# Patient Record
Sex: MALE | Race: ASIAN | NOT HISPANIC OR LATINO | Employment: FULL TIME | URBAN - METROPOLITAN AREA
[De-identification: names, ages, dates, MRNs, and addresses within clinical notes are randomized per-mention and may not be internally consistent; named-entity substitution may affect disease eponyms.]

---

## 2018-02-12 ENCOUNTER — APPOINTMENT (OUTPATIENT)
Dept: MEDICAL GROUP | Facility: MEDICAL CENTER | Age: 53
End: 2018-02-12
Payer: COMMERCIAL

## 2018-02-26 ENCOUNTER — OFFICE VISIT (OUTPATIENT)
Dept: MEDICAL GROUP | Facility: MEDICAL CENTER | Age: 53
End: 2018-02-26
Payer: COMMERCIAL

## 2018-02-26 VITALS
TEMPERATURE: 97.9 F | HEIGHT: 65 IN | HEART RATE: 84 BPM | OXYGEN SATURATION: 96 % | WEIGHT: 196.8 LBS | SYSTOLIC BLOOD PRESSURE: 124 MMHG | RESPIRATION RATE: 16 BRPM | BODY MASS INDEX: 32.79 KG/M2 | DIASTOLIC BLOOD PRESSURE: 88 MMHG

## 2018-02-26 DIAGNOSIS — I10 ESSENTIAL HYPERTENSION: ICD-10-CM

## 2018-02-26 DIAGNOSIS — E78.00 PURE HYPERCHOLESTEROLEMIA: ICD-10-CM

## 2018-02-26 DIAGNOSIS — R19.5 POSITIVE FECAL OCCULT BLOOD TEST: ICD-10-CM

## 2018-02-26 DIAGNOSIS — Z76.89 ENCOUNTER TO ESTABLISH CARE: ICD-10-CM

## 2018-02-26 PROCEDURE — 99204 OFFICE O/P NEW MOD 45 MIN: CPT | Performed by: PHYSICIAN ASSISTANT

## 2018-02-26 RX ORDER — SIMVASTATIN 10 MG
10 TABLET ORAL EVERY EVENING
Qty: 90 TAB | Refills: 3 | Status: SHIPPED | OUTPATIENT
Start: 2018-02-26 | End: 2019-06-25 | Stop reason: SDUPTHER

## 2018-02-26 RX ORDER — LISINOPRIL 40 MG/1
40 TABLET ORAL DAILY
Qty: 90 TAB | Refills: 3 | Status: SHIPPED | OUTPATIENT
Start: 2018-02-26 | End: 2019-04-26 | Stop reason: SDUPTHER

## 2018-02-26 RX ORDER — SIMVASTATIN 20 MG
10 TABLET ORAL
Status: SHIPPED | COMMUNITY
End: 2018-02-26 | Stop reason: SDUPTHER

## 2018-02-26 RX ORDER — AMLODIPINE BESYLATE 5 MG/1
5 TABLET ORAL DAILY
Qty: 90 TAB | Refills: 3 | Status: SHIPPED | OUTPATIENT
Start: 2018-02-26 | End: 2019-03-27 | Stop reason: SDUPTHER

## 2018-02-26 RX ORDER — LISINOPRIL 40 MG/1
40 TABLET ORAL DAILY
Status: SHIPPED | COMMUNITY
End: 2018-02-26 | Stop reason: SDUPTHER

## 2018-02-26 ASSESSMENT — PATIENT HEALTH QUESTIONNAIRE - PHQ9: CLINICAL INTERPRETATION OF PHQ2 SCORE: 0

## 2018-02-26 NOTE — LETTER
[employer]  Brayan De Oliveira P.A.-C.  [address]  [place] [address]  Fax: [phone]   Authorization for Release/Disclosure of   Protected Health Information   Name: LIN RAYGOZA : 1965 SSN: [id number]   Address: [address] [place] [address]  [place] [address] Phone:    [phone] (home)    I authorize the entity listed below to release/disclose the PHI below to:   [facility]/Brayan De Oliveira P.A.-C. and Brayan De Oliveira P.A.-C.   Provider or Entity Name:  Dr. Randall Chandler, [facility]   Address   City, [facility], [facility]   Phone:      Fax:     Reason for request: continuity of care   Information to be released:    [  ] LAST COLONOSCOPY,  including any PATH REPORT and follow-up  [  ] LAST FIT/COLOGUARD RESULT [  ] LAST DEXA  [  ] LAST MAMMOGRAM  [  ] LAST PAP  [  ] LAST LABS [  ] RETINA EXAM REPORT  [  ] IMMUNIZATION RECORDS  [ X ] Release all info      [  ] Check here and initial the line next to each item to release ALL health information INCLUDING  _____ Care and treatment for drug and / or alcohol abuse  _____ HIV testing, infection status, or AIDS  _____ Genetic Testing    DATES OF SERVICE OR TIME PERIOD TO BE DISCLOSED: _____________  I understand and acknowledge that:  * This Authorization may be revoked at any time by you in writing, except if your health information has already been used or disclosed.  * Your health information that will be used or disclosed as a result of you signing this authorization could be re-disclosed by the recipient. If this occurs, your re-disclosed health information may no longer be protected by State or Federal laws.  * You may refuse to sign this Authorization. Your refusal will not affect your ability to obtain treatment.  * This Authorization becomes effective upon signing and will  on (date) __________.      If no date is indicated, this Authorization will  one (1) year from the signature date.    Name: Lin Castaneda  Tri    Signature:   Date:     2/26/2018       PLEASE FAX REQUESTED RECORDS BACK TO: [phone]

## 2018-02-27 NOTE — ASSESSMENT & PLAN NOTE
Had a fecal occult blood test that was positive about 2 months ago. No family history of colon cancer. No change in weight. No change of bowel habits.

## 2018-02-27 NOTE — ASSESSMENT & PLAN NOTE
Has a chronic history of hyperlipidemia. He is taking Zocor 10 mg a day. Recent labs 2 months ago he believes were within normal limits. He was not contacted.

## 2018-02-27 NOTE — PROGRESS NOTES
"Subjective:   Olayinka Bartlett is a 52 y.o. male here today for establishing care and for hypertension and hypercholesterolemia.    Essential hypertension  This is a 52-year-old male accompanied by his wife, Danielle. He is here today to establish care. Has a chronic history of hypertension. Takes Norvasc and lisinopril daily. Requesting a refill. Has not been out of his medication.    Pure hypercholesterolemia  Has a chronic history of hyperlipidemia. He is taking Zocor 10 mg a day. Recent labs 2 months ago he believes were within normal limits. He was not contacted.    Positive fecal occult blood test  Had a fecal occult blood test that was positive about 2 months ago. No family history of colon cancer. No change in weight. No change of bowel habits.       Current medicines (including changes today)  Current Outpatient Prescriptions   Medication Sig Dispense Refill   • simvastatin (ZOCOR) 10 MG Tab Take 1 Tab by mouth every evening. 90 Tab 3   • amLODIPine (NORVASC) 5 MG Tab Take 1 Tab by mouth every day. 90 Tab 3   • lisinopril (PRINIVIL, ZESTRIL) 40 MG tablet Take 1 Tab by mouth every day. 90 Tab 3   • aspirin (ASA) 81 MG CHEW Take 81 mg by mouth every day.       No current facility-administered medications for this visit.      He  has no past medical history on file.    Social History and Family History were reviewed and updated.    ROS   No chest pain, no shortness of breath, no abdominal pain and all other systems were reviewed and are negative.       Objective:     Blood pressure 124/88, pulse 84, temperature 36.6 °C (97.9 °F), resp. rate 16, height 1.638 m (5' 4.5\"), weight 89.3 kg (196 lb 12.8 oz), SpO2 96 %. Body mass index is 33.26 kg/m².   Physical Exam:  Constitutional: Alert, no distress.  Skin: Warm, dry, good turgor, no rashes in visible areas.  Eye: Equal, round and reactive, conjunctiva clear, lids normal.  ENMT: Lips without lesions, good dentition, oropharynx clear.  Neck: Trachea " midline, no masses.   Lymph: No cervical or supraclavicular lymphadenopathy  Respiratory: Unlabored respiratory effort, lungs clear to auscultation, no wheezes, no ronchi.  Cardiovascular: Normal S1, S2, no murmur, no edema.  Abdomen: Soft, non-tender, no masses.  Psych: Alert and oriented x3, normal affect and mood.        Assessment and Plan:   The following treatment plan was discussed    1. Essential hypertension  Chronic condition. Stable. Renewed medications as directed.  - amLODIPine (NORVASC) 5 MG Tab; Take 1 Tab by mouth every day.  Dispense: 90 Tab; Refill: 3  - lisinopril (PRINIVIL, ZESTRIL) 40 MG tablet; Take 1 Tab by mouth every day.  Dispense: 90 Tab; Refill: 3    2. Pure hypercholesterolemia  Chronic condition. Likely controlled. Will obtain previous medical records from PCP. Renew Zocor 10 mg daily.  - simvastatin (ZOCOR) 10 MG Tab; Take 1 Tab by mouth every evening.  Dispense: 90 Tab; Refill: 3    3. Positive fecal occult blood test  Acute, new onset condition. Asymptomatic. Refer to GI to establish care for colorectal cancer screening.  - REFERRAL TO GASTROENTEROLOGY    4. Encounter to establish care      Followup: Return if symptoms worsen or fail to improve.    Please note that this dictation was created using voice recognition software. I have made every reasonable attempt to correct obvious errors, but I expect that there are errors of grammar and possibly content that I did not discover before finalizing the note.

## 2018-02-27 NOTE — ASSESSMENT & PLAN NOTE
This is a 52-year-old male accompanied by his wife, Danielle. He is here today to establish care. Has a chronic history of hypertension. Takes Norvasc and lisinopril daily. Requesting a refill. Has not been out of his medication.

## 2019-02-12 ENCOUNTER — OFFICE VISIT (OUTPATIENT)
Dept: MEDICAL GROUP | Facility: MEDICAL CENTER | Age: 54
End: 2019-02-12
Payer: COMMERCIAL

## 2019-02-12 VITALS
DIASTOLIC BLOOD PRESSURE: 84 MMHG | TEMPERATURE: 99.8 F | WEIGHT: 203.8 LBS | HEIGHT: 64 IN | SYSTOLIC BLOOD PRESSURE: 122 MMHG | OXYGEN SATURATION: 95 % | HEART RATE: 85 BPM | BODY MASS INDEX: 34.79 KG/M2

## 2019-02-12 DIAGNOSIS — I10 ESSENTIAL HYPERTENSION: ICD-10-CM

## 2019-02-12 DIAGNOSIS — Z00.00 PREVENTATIVE HEALTH CARE: ICD-10-CM

## 2019-02-12 DIAGNOSIS — R19.5 POSITIVE FECAL OCCULT BLOOD TEST: ICD-10-CM

## 2019-02-12 DIAGNOSIS — M54.9 UPPER BACK PAIN ON LEFT SIDE: ICD-10-CM

## 2019-02-12 DIAGNOSIS — E78.00 PURE HYPERCHOLESTEROLEMIA: ICD-10-CM

## 2019-02-12 DIAGNOSIS — L91.8 MULTIPLE ACQUIRED SKIN TAGS: ICD-10-CM

## 2019-02-12 PROCEDURE — 99214 OFFICE O/P EST MOD 30 MIN: CPT | Performed by: PHYSICIAN ASSISTANT

## 2019-02-12 ASSESSMENT — PATIENT HEALTH QUESTIONNAIRE - PHQ9: CLINICAL INTERPRETATION OF PHQ2 SCORE: 0

## 2019-02-13 NOTE — ASSESSMENT & PLAN NOTE
Has several skin tags on his face.  Not one up above his eyelid on the left side.  Would like them removed.

## 2019-02-13 NOTE — PROGRESS NOTES
"Subjective:   Olayinka Bartlett is a 53 y.o. male here today for upper back pain on the left side for several months, skin tags, positive blood in his stool, hypertension and hypercholesterolemia.    Upper back pain on left side  This is a 53-year-old male accompanied by his wife, Ashley.  He complains of a chronic history of intermittent upper left-sided back pain.  Symptoms only occur when he sneezes or coughs.  Has not done so in a while.  Denies any trauma.  Denies any lingering symptoms.  No arm pain.  No chest pain.  No medications.    Multiple acquired skin tags  Has several skin tags on his face.  Not one up above his eyelid on the left side.  Would like them removed.    Positive fecal occult blood test  Positive history.  No following up with GI.  Denies any weight loss or change in bowel habits.    Essential hypertension  Chronic condition.  Taking his medication.  Denies any chest pain or shortness of breath.  No recent labs ordered.    Pure hypercholesterolemia  Chronic condition.  Status unknown.  Takes Zocor 10 mg daily.       Current medicines (including changes today)  Current Outpatient Prescriptions   Medication Sig Dispense Refill   • simvastatin (ZOCOR) 10 MG Tab Take 1 Tab by mouth every evening. 90 Tab 3   • amLODIPine (NORVASC) 5 MG Tab Take 1 Tab by mouth every day. 90 Tab 3   • lisinopril (PRINIVIL, ZESTRIL) 40 MG tablet Take 1 Tab by mouth every day. 90 Tab 3   • aspirin (ASA) 81 MG CHEW Take 81 mg by mouth every day.       No current facility-administered medications for this visit.      He  has no past medical history on file.    Social History and Family History were reviewed and updated.    ROS   No chest pain, no shortness of breath, no abdominal pain and all other systems were reviewed and are negative.       Objective:     Blood pressure 122/84, pulse 85, temperature 37.7 °C (99.8 °F), temperature source Temporal, height 1.626 m (5' 4\"), weight 92.4 kg (203 lb 12.8 oz), SpO2 " 95 %. Body mass index is 34.98 kg/m².   Physical Exam:  Constitutional: Alert, no distress.  Skin: Warm, dry, good turgor, no rashes in visible areas.  Multiple skin tags of his face.  Largest one above his his upper left eyelid.   Eye: Equal, round and reactive, conjunctiva clear, lids normal.  ENMT: Lips without lesions, good dentition, oropharynx clear.  Neck: Trachea midline, no masses.   Lymph: No cervical or supraclavicular lymphadenopathy  Respiratory: Unlabored respiratory effort, lungs clear to auscultation, no wheezes, no ronchi.  Cardiovascular: Normal S1, S2, no murmur, no edema.  Abdomen: Soft, non-tender, no masses.  Psych: Alert and oriented x3, normal affect and mood.        Assessment and Plan:   The following treatment plan was discussed    1. Upper back pain on left side  Chronic condition.  Only occurs with coughing or sneezing.  May be a pinched nerve.  Referred to chiropractic services.  - REFERRAL TO CHIROPRACTIC    2. Positive fecal occult blood test  Referred to GI.  Discussed importance of seeing GI for colonoscopy.  - REFERRAL TO GI FOR COLONOSCOPY    3. Pure hypercholesterolemia  Chronic condition.  Status unknown.  Ordered labs.  Continue Zocor.  - Lipid Profile; Future    4. Multiple acquired skin tags  New condition noted but chronic.  Referred to dermatology for excision.  - REFERRAL TO DERMATOLOGY    5. Essential hypertension  Chronic condition.  Stable.  Continue medication as directed.  Ordered CMP.  - Comp Metabolic Panel; Future    6. Preventative health care  Ordered labs fasting.  He will be contacted with the else.  Concerned about diabetes.  - HEMOGLOBIN A1C; Future  - PROSTATE SPECIFIC AG SCREENING; Future      Followup: Return in about 6 months (around 8/12/2019), or if symptoms worsen or fail to improve.    Please note that this dictation was created using voice recognition software. I have made every reasonable attempt to correct obvious errors, but I expect that there  are errors of grammar and possibly content that I did not discover before finalizing the note.

## 2019-02-13 NOTE — ASSESSMENT & PLAN NOTE
Chronic condition.  Taking his medication.  Denies any chest pain or shortness of breath.  No recent labs ordered.

## 2019-02-13 NOTE — ASSESSMENT & PLAN NOTE
This is a 53-year-old male accompanied by his wife, Ashley.  He complains of a chronic history of intermittent upper left-sided back pain.  Symptoms only occur when he sneezes or coughs.  Has not done so in a while.  Denies any trauma.  Denies any lingering symptoms.  No arm pain.  No chest pain.  No medications.

## 2019-02-16 ENCOUNTER — HOSPITAL ENCOUNTER (OUTPATIENT)
Dept: LAB | Facility: MEDICAL CENTER | Age: 54
End: 2019-02-16
Attending: PHYSICIAN ASSISTANT
Payer: COMMERCIAL

## 2019-02-16 DIAGNOSIS — E78.00 PURE HYPERCHOLESTEROLEMIA: ICD-10-CM

## 2019-02-16 DIAGNOSIS — Z00.00 PREVENTATIVE HEALTH CARE: ICD-10-CM

## 2019-02-16 DIAGNOSIS — I10 ESSENTIAL HYPERTENSION: ICD-10-CM

## 2019-02-16 LAB
ALBUMIN SERPL BCP-MCNC: 4.2 G/DL (ref 3.2–4.9)
ALBUMIN/GLOB SERPL: 1.5 G/DL
ALP SERPL-CCNC: 36 U/L (ref 30–99)
ALT SERPL-CCNC: 48 U/L (ref 2–50)
ANION GAP SERPL CALC-SCNC: 9 MMOL/L (ref 0–11.9)
AST SERPL-CCNC: 26 U/L (ref 12–45)
BILIRUB SERPL-MCNC: 0.5 MG/DL (ref 0.1–1.5)
BUN SERPL-MCNC: 14 MG/DL (ref 8–22)
CALCIUM SERPL-MCNC: 9 MG/DL (ref 8.5–10.5)
CHLORIDE SERPL-SCNC: 105 MMOL/L (ref 96–112)
CHOLEST SERPL-MCNC: 154 MG/DL (ref 100–199)
CO2 SERPL-SCNC: 26 MMOL/L (ref 20–33)
CREAT SERPL-MCNC: 0.99 MG/DL (ref 0.5–1.4)
EST. AVERAGE GLUCOSE BLD GHB EST-MCNC: 123 MG/DL
FASTING STATUS PATIENT QL REPORTED: NORMAL
GLOBULIN SER CALC-MCNC: 2.8 G/DL (ref 1.9–3.5)
GLUCOSE SERPL-MCNC: 88 MG/DL (ref 65–99)
HBA1C MFR BLD: 5.9 % (ref 0–5.6)
HDLC SERPL-MCNC: 55 MG/DL
LDLC SERPL CALC-MCNC: 86 MG/DL
POTASSIUM SERPL-SCNC: 4.1 MMOL/L (ref 3.6–5.5)
PROT SERPL-MCNC: 7 G/DL (ref 6–8.2)
PSA SERPL-MCNC: 0.85 NG/ML (ref 0–4)
SODIUM SERPL-SCNC: 140 MMOL/L (ref 135–145)
TRIGL SERPL-MCNC: 66 MG/DL (ref 0–149)

## 2019-02-16 PROCEDURE — 80053 COMPREHEN METABOLIC PANEL: CPT

## 2019-02-16 PROCEDURE — 83036 HEMOGLOBIN GLYCOSYLATED A1C: CPT

## 2019-02-16 PROCEDURE — 84153 ASSAY OF PSA TOTAL: CPT

## 2019-02-16 PROCEDURE — 36415 COLL VENOUS BLD VENIPUNCTURE: CPT

## 2019-02-16 PROCEDURE — 80061 LIPID PANEL: CPT

## 2019-02-19 ENCOUNTER — TELEPHONE (OUTPATIENT)
Dept: MEDICAL GROUP | Facility: MEDICAL CENTER | Age: 54
End: 2019-02-19

## 2019-02-19 PROBLEM — R73.03 PREDIABETES: Status: ACTIVE | Noted: 2019-02-19

## 2019-02-19 NOTE — TELEPHONE ENCOUNTER
Please contact Jn.  Labs are good except he has prediabetes.  Exercise routinely if not already.  Watch sugar and carbohydrate intake.  Follow-up with any questions.  Repeat yearly. Thank you.

## 2019-03-27 DIAGNOSIS — I10 ESSENTIAL HYPERTENSION: ICD-10-CM

## 2019-03-28 RX ORDER — AMLODIPINE BESYLATE 5 MG/1
TABLET ORAL
Qty: 90 TAB | Refills: 2 | Status: SHIPPED | OUTPATIENT
Start: 2019-03-28 | End: 2019-12-22

## 2019-04-26 DIAGNOSIS — I10 ESSENTIAL HYPERTENSION: ICD-10-CM

## 2019-04-26 RX ORDER — LISINOPRIL 40 MG/1
TABLET ORAL
Qty: 90 TAB | Refills: 2 | Status: SHIPPED | OUTPATIENT
Start: 2019-04-26 | End: 2020-01-09

## 2019-06-25 DIAGNOSIS — E78.00 PURE HYPERCHOLESTEROLEMIA: ICD-10-CM

## 2019-06-25 RX ORDER — SIMVASTATIN 10 MG
10 TABLET ORAL EVERY EVENING
Qty: 90 TAB | Refills: 1 | Status: SHIPPED | OUTPATIENT
Start: 2019-06-25 | End: 2019-12-22

## 2020-01-09 DIAGNOSIS — R73.03 PREDIABETES: ICD-10-CM

## 2020-01-09 DIAGNOSIS — E78.00 PURE HYPERCHOLESTEROLEMIA: ICD-10-CM

## 2020-01-09 DIAGNOSIS — Z12.5 SCREENING PSA (PROSTATE SPECIFIC ANTIGEN): ICD-10-CM

## 2020-01-09 DIAGNOSIS — Z00.00 PREVENTATIVE HEALTH CARE: ICD-10-CM

## 2020-01-09 DIAGNOSIS — I10 ESSENTIAL HYPERTENSION: ICD-10-CM

## 2020-01-15 ENCOUNTER — APPOINTMENT (OUTPATIENT)
Dept: LAB | Facility: MEDICAL CENTER | Age: 55
End: 2020-01-15
Attending: PHYSICIAN ASSISTANT
Payer: COMMERCIAL

## 2020-01-24 ENCOUNTER — OFFICE VISIT (OUTPATIENT)
Dept: MEDICAL GROUP | Facility: MEDICAL CENTER | Age: 55
End: 2020-01-24
Payer: COMMERCIAL

## 2020-01-24 VITALS
RESPIRATION RATE: 16 BRPM | SYSTOLIC BLOOD PRESSURE: 122 MMHG | TEMPERATURE: 97.4 F | HEIGHT: 64 IN | WEIGHT: 200 LBS | DIASTOLIC BLOOD PRESSURE: 84 MMHG | BODY MASS INDEX: 34.15 KG/M2 | OXYGEN SATURATION: 94 % | HEART RATE: 76 BPM

## 2020-01-24 DIAGNOSIS — Z12.11 SCREENING FOR COLORECTAL CANCER: ICD-10-CM

## 2020-01-24 DIAGNOSIS — E78.00 PURE HYPERCHOLESTEROLEMIA: ICD-10-CM

## 2020-01-24 DIAGNOSIS — Z12.12 SCREENING FOR COLORECTAL CANCER: ICD-10-CM

## 2020-01-24 DIAGNOSIS — Z23 NEED FOR VACCINATION: ICD-10-CM

## 2020-01-24 DIAGNOSIS — E66.9 OBESITY (BMI 30-39.9): ICD-10-CM

## 2020-01-24 DIAGNOSIS — R09.81 NASAL CONGESTION: ICD-10-CM

## 2020-01-24 DIAGNOSIS — M54.9 UPPER BACK PAIN ON LEFT SIDE: ICD-10-CM

## 2020-01-24 DIAGNOSIS — R06.83 SNORING: ICD-10-CM

## 2020-01-24 DIAGNOSIS — I10 ESSENTIAL HYPERTENSION: ICD-10-CM

## 2020-01-24 DIAGNOSIS — R19.5 POSITIVE FECAL OCCULT BLOOD TEST: ICD-10-CM

## 2020-01-24 DIAGNOSIS — R73.03 PREDIABETES: ICD-10-CM

## 2020-01-24 PROCEDURE — 90471 IMMUNIZATION ADMIN: CPT | Performed by: PHYSICIAN ASSISTANT

## 2020-01-24 PROCEDURE — 90686 IIV4 VACC NO PRSV 0.5 ML IM: CPT | Performed by: PHYSICIAN ASSISTANT

## 2020-01-24 PROCEDURE — 99214 OFFICE O/P EST MOD 30 MIN: CPT | Mod: 25 | Performed by: PHYSICIAN ASSISTANT

## 2020-01-24 RX ORDER — LISINOPRIL 40 MG/1
TABLET ORAL
Qty: 90 TAB | Refills: 1 | Status: SHIPPED | OUTPATIENT
Start: 2020-01-24 | End: 2020-06-07

## 2020-01-24 RX ORDER — SIMVASTATIN 10 MG
TABLET ORAL
Qty: 90 TAB | Refills: 1 | Status: SHIPPED | OUTPATIENT
Start: 2020-01-24 | End: 2020-07-31 | Stop reason: SDUPTHER

## 2020-01-24 RX ORDER — AMLODIPINE BESYLATE 5 MG/1
TABLET ORAL
Qty: 90 TAB | Refills: 1 | Status: SHIPPED | OUTPATIENT
Start: 2020-01-24 | End: 2020-07-31 | Stop reason: SDUPTHER

## 2020-01-24 RX ORDER — CYCLOBENZAPRINE HCL 10 MG
10 TABLET ORAL 3 TIMES DAILY PRN
Qty: 30 TAB | Refills: 1 | Status: SHIPPED | OUTPATIENT
Start: 2020-01-24 | End: 2021-01-29 | Stop reason: SDUPTHER

## 2020-01-24 ASSESSMENT — PATIENT HEALTH QUESTIONNAIRE - PHQ9: CLINICAL INTERPRETATION OF PHQ2 SCORE: 0

## 2020-01-24 NOTE — ASSESSMENT & PLAN NOTE
Chronic condition.  Takes amlodipine and lisinopril daily.  Requesting refill today.  Blood pressure today is well controlled.

## 2020-01-24 NOTE — ASSESSMENT & PLAN NOTE
This is a 54-year-old male accompanied by his wife, Gaby.  Here today to follow-up on his health.  Did not get a colonoscopy.  Had a positive fecal blood test.  No family history of colon cancer.  Denies any further rectal bleeding.  No weight loss.  No abdominal pain.  It is been almost a year since the positive test.

## 2020-01-24 NOTE — ASSESSMENT & PLAN NOTE
Complains of a chronic condition of bilateral nasal congestion.  Difficulty breathing through his nasal passages.  Snoring.  Loss of smell.  States he had a couple fractures of his nose.  Would like to see an ear nose and throat.

## 2020-01-24 NOTE — PROGRESS NOTES
Subjective:   Olayinka Bartlett is a 54 y.o. male here today for history of positive fecal occult blood test, hypercholesterolemia, prediabetes, hypertension, upper back pain on the left side, nasal congestion and preventative health care.    Positive fecal occult blood test  This is a 54-year-old male accompanied by his wife, Gaby.  Here today to follow-up on his health.  Did not get a colonoscopy.  Had a positive fecal blood test.  No family history of colon cancer.  Denies any further rectal bleeding.  No weight loss.  No abdominal pain.  It is been almost a year since the positive test.    Pure hypercholesterolemia  Chronic condition.  Recent cholesterol profile showed good numbers.  LDL slightly above 100.  Takes Zocor 10 mg daily.    Prediabetes  Recent labs showed an A1c at 5.8.  He does not exercise routinely.  Likes carbs.  Does not drink too much soda.    Upper back pain on left side  Chronic condition.  Still has intermittent left upper back pain.  Would like a muscle relaxer.  Has been effective in the past.  No interest today and following with a specialist.    Nasal congestion  Complains of a chronic condition of bilateral nasal congestion.  Difficulty breathing through his nasal passages.  Snoring.  Loss of smell.  States he had a couple fractures of his nose.  Would like to see an ear nose and throat.    Essential hypertension  Chronic condition.  Takes amlodipine and lisinopril daily.  Requesting refill today.  Blood pressure today is well controlled.      Current medicines (including changes today)  Current Outpatient Medications   Medication Sig Dispense Refill   • amLODIPine (NORVASC) 5 MG Tab TAKE ONE TABLET BY MOUTH DAILY 90 Tab 1   • lisinopril (PRINIVIL) 40 MG tablet TAKE ONE TABLET BY MOUTH DAILY 90 Tab 1   • simvastatin (ZOCOR) 10 MG Tab TAKE ONE TABLET BY MOUTH EVERY EVENING 90 Tab 1   • cyclobenzaprine (FLEXERIL) 10 MG Tab Take 1 Tab by mouth 3 times a day as needed. 30 Tab 1   •  "aspirin (ASA) 81 MG CHEW Take 81 mg by mouth every day.       No current facility-administered medications for this visit.      He  has no past medical history on file.    Social History and Family History were reviewed and updated.    ROS   No chest pain, no shortness of breath, no abdominal pain and all other systems were reviewed and are negative.       Objective:     /84 (BP Location: Right arm, Patient Position: Sitting, BP Cuff Size: Adult)   Pulse 76   Temp 36.3 °C (97.4 °F) (Temporal)   Resp 16   Ht 1.626 m (5' 4\")   Wt 90.7 kg (200 lb)   SpO2 94%  Body mass index is 34.33 kg/m².   Physical Exam:  Constitutional: Alert, no distress.  Skin: Warm, dry, good turgor, no rashes in visible areas.  Eye: Equal, round and reactive, conjunctiva clear, lids normal.  ENMT: Lips without lesions, good dentition, oropharynx clear.  Neck: Trachea midline, no masses.   Lymph: No cervical or supraclavicular lymphadenopathy  Respiratory: Unlabored respiratory effort, lungs clear to auscultation, no wheezes, no ronchi.  Cardiovascular: Normal S1, S2, no murmur, no edema.  Psych: Alert and oriented x3, normal affect and mood.        Assessment and Plan:   The following treatment plan was discussed    1. Positive fecal occult blood test  Prior history.  No family history of any colon cancer.  Given the circumstances ordered Cologuard.    2. Essential hypertension  Chronic condition.  Controlled.  Renewed Norvasc and lisinopril daily.  - amLODIPine (NORVASC) 5 MG Tab; TAKE ONE TABLET BY MOUTH DAILY  Dispense: 90 Tab; Refill: 1  - lisinopril (PRINIVIL) 40 MG tablet; TAKE ONE TABLET BY MOUTH DAILY  Dispense: 90 Tab; Refill: 1    3. Pure hypercholesterolemia  Chronic condition.  Stable.  Recent labs look good.  Renewed Zocor 10 mg.  - simvastatin (ZOCOR) 10 MG Tab; TAKE ONE TABLET BY MOUTH EVERY EVENING  Dispense: 90 Tab; Refill: 1    4. Need for vaccination  Administered without complaints.  - Influenza Vaccine Quad " Injection (PF)    5. Obesity (BMI 30-39.9)  Chronic condition.  We will continue to monitor.  - Patient identified as having weight management issue.  Appropriate orders and counseling given.    6. Upper back pain on left side  Chronic condition.  Stable.  Renewed Flexeril as directed.  - cyclobenzaprine (FLEXERIL) 10 MG Tab; Take 1 Tab by mouth 3 times a day as needed.  Dispense: 30 Tab; Refill: 1    7. Nasal congestion  New condition noted in chart but chronic.  Refer to ENT for evaluation given history of constellation of symptoms.  - REFERRAL TO ENT    8. Snoring  Chronic condition.  Refer to ENT for evaluation.  No history of any witnessed apneic events.  - REFERRAL TO ENT    9. Screening for colorectal cancer  Ordered Cologuard.  Discussed testing.  - COLOGUARD (FIT DNA)    10. Prediabetes  New condition noted in chart.  Discussed losing weight.  Exercise routinely.  Watch carb intake.  We will continue to monitor.      Followup: Return in about 6 months (around 7/24/2020).    Please note that this dictation was created using voice recognition software. I have made every reasonable attempt to correct obvious errors, but I expect that there are errors of grammar and possibly content that I did not discover before finalizing the note.

## 2020-01-24 NOTE — ASSESSMENT & PLAN NOTE
Chronic condition.  Recent cholesterol profile showed good numbers.  LDL slightly above 100.  Takes Zocor 10 mg daily.

## 2020-01-24 NOTE — ASSESSMENT & PLAN NOTE
Recent labs showed an A1c at 5.8.  He does not exercise routinely.  Likes carbs.  Does not drink too much soda.

## 2020-01-24 NOTE — ASSESSMENT & PLAN NOTE
Chronic condition.  Still has intermittent left upper back pain.  Would like a muscle relaxer.  Has been effective in the past.  No interest today and following with a specialist.

## 2020-07-31 ENCOUNTER — OFFICE VISIT (OUTPATIENT)
Dept: MEDICAL GROUP | Facility: MEDICAL CENTER | Age: 55
End: 2020-07-31
Payer: COMMERCIAL

## 2020-07-31 VITALS
OXYGEN SATURATION: 95 % | TEMPERATURE: 97.4 F | WEIGHT: 198.41 LBS | SYSTOLIC BLOOD PRESSURE: 124 MMHG | BODY MASS INDEX: 33.87 KG/M2 | HEIGHT: 64 IN | DIASTOLIC BLOOD PRESSURE: 82 MMHG | HEART RATE: 88 BPM | RESPIRATION RATE: 16 BRPM

## 2020-07-31 DIAGNOSIS — I10 ESSENTIAL HYPERTENSION: ICD-10-CM

## 2020-07-31 DIAGNOSIS — E55.9 VITAMIN D DEFICIENCY: ICD-10-CM

## 2020-07-31 DIAGNOSIS — R73.03 PREDIABETES: ICD-10-CM

## 2020-07-31 DIAGNOSIS — E78.00 PURE HYPERCHOLESTEROLEMIA: ICD-10-CM

## 2020-07-31 DIAGNOSIS — Z78.9 VARICELLA VACCINATION STATUS UNKNOWN: ICD-10-CM

## 2020-07-31 DIAGNOSIS — M54.9 UPPER BACK PAIN ON LEFT SIDE: ICD-10-CM

## 2020-07-31 PROBLEM — R19.5 POSITIVE FECAL OCCULT BLOOD TEST: Status: RESOLVED | Noted: 2018-02-26 | Resolved: 2020-07-31

## 2020-07-31 PROCEDURE — 99214 OFFICE O/P EST MOD 30 MIN: CPT | Performed by: PHYSICIAN ASSISTANT

## 2020-07-31 RX ORDER — LISINOPRIL 40 MG/1
TABLET ORAL
Qty: 90 TAB | Refills: 1 | Status: SHIPPED | OUTPATIENT
Start: 2020-07-31 | End: 2021-01-29 | Stop reason: SDUPTHER

## 2020-07-31 RX ORDER — LISINOPRIL 40 MG/1
TABLET ORAL
COMMUNITY
Start: 2020-05-09 | End: 2020-07-31

## 2020-07-31 RX ORDER — SIMVASTATIN 10 MG
TABLET ORAL
Qty: 90 TAB | Refills: 1 | Status: SHIPPED | OUTPATIENT
Start: 2020-07-31 | End: 2021-01-26

## 2020-07-31 RX ORDER — AMLODIPINE BESYLATE 5 MG/1
TABLET ORAL
Qty: 90 TAB | Refills: 1 | Status: SHIPPED | OUTPATIENT
Start: 2020-07-31 | End: 2021-01-26

## 2020-07-31 RX ORDER — AMLODIPINE BESYLATE 5 MG/1
TABLET ORAL
COMMUNITY
Start: 2020-06-05 | End: 2020-07-31

## 2020-07-31 RX ORDER — SIMVASTATIN 10 MG
TABLET ORAL
COMMUNITY
Start: 2020-06-05 | End: 2020-07-31

## 2020-07-31 NOTE — ASSESSMENT & PLAN NOTE
Still has chronic upper back pain on the left side usually when getting up after laying down in bed.  In the past I referred him to see chiropractic services but he did not follow-up.  Pain is intermittent and at times he takes Flexeril.  Does not need a renewal.

## 2020-07-31 NOTE — ASSESSMENT & PLAN NOTE
This is a 55-year-old male accompanied by his wife, Gaby.  Here today to discuss his chronic medical conditions.  Doing well.  No new complaints.  Requesting refill the day of simvastatin.  Last cholesterol profile showed his .  No known history of CAD.  Does take a baby aspirin daily.

## 2020-07-31 NOTE — PROGRESS NOTES
Subjective:   Olayinka Bartlett is a 55 y.o. male here today for pure hypercholesterolemia, prediabetes, hypertension, upper back pain on the left side which is chronic and varicella vaccination status unknown.    Pure hypercholesterolemia  This is a 55-year-old male accompanied by his wife, Gaby.  Here today to discuss his chronic medical conditions.  Doing well.  No new complaints.  Requesting refill the day of simvastatin.  Last cholesterol profile showed his .  No known history of CAD.  Does take a baby aspirin daily.    Prediabetes  Last A1c at 5.8.  Chronic condition.    Essential hypertension  Chronic condition.  Currently on lisinopril 40 mg and amlodipine 5.  No new complaints today.  No chest pain.    Upper back pain on left side  Still has chronic upper back pain on the left side usually when getting up after laying down in bed.  In the past I referred him to see chiropractic services but he did not follow-up.  Pain is intermittent and at times he takes Flexeril.  Does not need a renewal.    Varicella vaccination status unknown  He is not sure if he had chickenpox as a youngster.  Brother who is 4 years older has it because he had shingles over his eye few years ago.    Vitamin D deficiency  Recent vitamin D profile at 16.  For some reason I did not mention it to him during last visit.  He does not take a vitamin D supplement.       Current medicines (including changes today)  Current Outpatient Medications   Medication Sig Dispense Refill   • amLODIPine (NORVASC) 5 MG Tab TAKE ONE TABLET BY MOUTH DAILY 90 Tab 1   • lisinopril (PRINIVIL) 40 MG tablet TAKE ONE TABLET BY MOUTH DAILY 90 Tab 1   • simvastatin (ZOCOR) 10 MG Tab TAKE ONE TABLET BY MOUTH EVERY EVENING 90 Tab 1   • cyclobenzaprine (FLEXERIL) 10 MG Tab Take 1 Tab by mouth 3 times a day as needed. 30 Tab 1   • aspirin (ASA) 81 MG CHEW Take 81 mg by mouth every day.       No current facility-administered medications for this visit.   "    He  has no past medical history on file.    Social History and Family History were reviewed and updated.    ROS   No chest pain, no shortness of breath, no abdominal pain and all other systems were reviewed and are negative.       Objective:     /82   Pulse 88   Temp 36.3 °C (97.4 °F) (Temporal)   Resp 16   Ht 1.626 m (5' 4\")   Wt 90 kg (198 lb 6.6 oz)   SpO2 95%  Body mass index is 34.06 kg/m².   Physical Exam:  Constitutional: Alert, no distress.  Skin: Warm, dry, good turgor, no rashes in visible areas.  Eye: Equal, round and reactive, conjunctiva clear, lids normal.  ENMT: Lips without lesions, good dentition, oropharynx clear.  Neck: Trachea midline, no masses.   Lymph: No cervical or supraclavicular lymphadenopathy  Respiratory: Unlabored respiratory effort, lungs appear clear, no wheezes.  Cardiovascular: Regular rate and rhythm.  Psych: Alert and oriented x3, normal affect and mood.        Assessment and Plan:   The following treatment plan was discussed    1. Pure hypercholesterolemia  Chronic condition.  Stable.  Renewed simvastatin 10 mg.  Ordered CT cardiac scoring and discussed test and risk and benefits of the test.  - CT-CARDIAC SCORING; Future  - simvastatin (ZOCOR) 10 MG Tab; TAKE ONE TABLET BY MOUTH EVERY EVENING  Dispense: 90 Tab; Refill: 1    2. Essential hypertension  Chronic condition.  Controlled.  Renew Norvasc and lisinopril as directed.  - amLODIPine (NORVASC) 5 MG Tab; TAKE ONE TABLET BY MOUTH DAILY  Dispense: 90 Tab; Refill: 1  - lisinopril (PRINIVIL) 40 MG tablet; TAKE ONE TABLET BY MOUTH DAILY  Dispense: 90 Tab; Refill: 1    3. Prediabetes  Chronic condition.  Stable.  Recent A1c good at 5.8.  Continue lifestyle modifications.    4. Vitamin D deficiency  New condition noted in chart but likely chronic.  Advised to take 5000 units of vitamin D3.    5. Upper back pain on left side  Chronic condition.  Stable.  He will contact me when he would like a referral for evaluation " and possibly to a chiropractor.    6. Varicella vaccination status unknown  He will contact his mother regarding his status.  During next office visit if still not known will order varicella antibody test.  Discussed Shingrix vaccination and its importance.  May also contact insurance regarding cost.      Followup: Return in about 6 months (around 1/31/2021), or if symptoms worsen or fail to improve.    Please note that this dictation was created using voice recognition software. I have made every reasonable attempt to correct obvious errors, but I expect that there are errors of grammar and possibly content that I did not discover before finalizing the note.

## 2020-07-31 NOTE — ASSESSMENT & PLAN NOTE
He is not sure if he had chickenpox as a youngster.  Brother who is 4 years older has it because he had shingles over his eye few years ago.

## 2020-07-31 NOTE — ASSESSMENT & PLAN NOTE
Chronic condition.  Currently on lisinopril 40 mg and amlodipine 5.  No new complaints today.  No chest pain.

## 2020-07-31 NOTE — ASSESSMENT & PLAN NOTE
Recent vitamin D profile at 16.  For some reason I did not mention it to him during last visit.  He does not take a vitamin D supplement.

## 2020-08-14 ENCOUNTER — HOSPITAL ENCOUNTER (OUTPATIENT)
Dept: RADIOLOGY | Facility: MEDICAL CENTER | Age: 55
End: 2020-08-14
Attending: PHYSICIAN ASSISTANT
Payer: COMMERCIAL

## 2020-08-14 DIAGNOSIS — E78.00 PURE HYPERCHOLESTEROLEMIA: ICD-10-CM

## 2020-08-14 PROCEDURE — 4410556 CT-CARDIAC SCORING: Mod: MF

## 2020-08-16 DIAGNOSIS — R91.8 ABNORMAL FINDING ON LUNG IMAGING: ICD-10-CM

## 2020-08-16 DIAGNOSIS — R93.1 ABNORMAL SCREENING CT OF HEART: ICD-10-CM

## 2020-08-20 DIAGNOSIS — R91.8 ABNORMAL FINDING ON LUNG IMAGING: ICD-10-CM

## 2020-09-04 ENCOUNTER — HOSPITAL ENCOUNTER (OUTPATIENT)
Dept: RADIOLOGY | Facility: MEDICAL CENTER | Age: 55
End: 2020-09-04
Attending: PHYSICIAN ASSISTANT
Payer: COMMERCIAL

## 2020-09-04 DIAGNOSIS — R91.8 ABNORMAL FINDING ON LUNG IMAGING: ICD-10-CM

## 2020-09-04 PROCEDURE — 71250 CT THORAX DX C-: CPT

## 2020-09-05 PROBLEM — F17.201 TOBACCO DEPENDENCE IN REMISSION: Status: ACTIVE | Noted: 2020-09-05

## 2021-01-29 ENCOUNTER — OFFICE VISIT (OUTPATIENT)
Dept: MEDICAL GROUP | Facility: MEDICAL CENTER | Age: 56
End: 2021-01-29
Payer: COMMERCIAL

## 2021-01-29 VITALS
BODY MASS INDEX: 33.49 KG/M2 | DIASTOLIC BLOOD PRESSURE: 86 MMHG | OXYGEN SATURATION: 95 % | WEIGHT: 201 LBS | RESPIRATION RATE: 16 BRPM | HEART RATE: 88 BPM | SYSTOLIC BLOOD PRESSURE: 130 MMHG | TEMPERATURE: 97 F | HEIGHT: 65 IN

## 2021-01-29 DIAGNOSIS — Z23 NEED FOR INFLUENZA VACCINATION: ICD-10-CM

## 2021-01-29 DIAGNOSIS — Z78.9 VARICELLA VACCINATION STATUS UNKNOWN: ICD-10-CM

## 2021-01-29 DIAGNOSIS — I10 ESSENTIAL HYPERTENSION: ICD-10-CM

## 2021-01-29 DIAGNOSIS — Z00.00 PREVENTATIVE HEALTH CARE: ICD-10-CM

## 2021-01-29 DIAGNOSIS — E66.9 OBESITY (BMI 30-39.9): ICD-10-CM

## 2021-01-29 DIAGNOSIS — E78.00 PURE HYPERCHOLESTEROLEMIA: ICD-10-CM

## 2021-01-29 DIAGNOSIS — M54.9 UPPER BACK PAIN ON LEFT SIDE: ICD-10-CM

## 2021-01-29 DIAGNOSIS — Z12.5 SCREENING PSA (PROSTATE SPECIFIC ANTIGEN): ICD-10-CM

## 2021-01-29 PROBLEM — R93.1 AGATSTON CORONARY ARTERY CALCIUM SCORE LESS THAN 100: Status: ACTIVE | Noted: 2021-01-29

## 2021-01-29 PROBLEM — R91.8 ABNORMAL FINDING ON LUNG IMAGING: Status: RESOLVED | Noted: 2020-08-20 | Resolved: 2021-01-29

## 2021-01-29 PROCEDURE — 99214 OFFICE O/P EST MOD 30 MIN: CPT | Mod: 25 | Performed by: PHYSICIAN ASSISTANT

## 2021-01-29 PROCEDURE — 90471 IMMUNIZATION ADMIN: CPT | Performed by: PHYSICIAN ASSISTANT

## 2021-01-29 PROCEDURE — 90686 IIV4 VACC NO PRSV 0.5 ML IM: CPT | Performed by: PHYSICIAN ASSISTANT

## 2021-01-29 RX ORDER — SIMVASTATIN 10 MG
10 TABLET ORAL EVERY EVENING
Qty: 90 TAB | Refills: 2 | Status: SHIPPED | OUTPATIENT
Start: 2021-01-29 | End: 2021-08-09 | Stop reason: SDUPTHER

## 2021-01-29 RX ORDER — CYCLOBENZAPRINE HCL 10 MG
10 TABLET ORAL 3 TIMES DAILY PRN
Qty: 30 TAB | Refills: 1 | Status: SHIPPED | OUTPATIENT
Start: 2021-01-29 | End: 2022-11-04

## 2021-01-29 RX ORDER — AMLODIPINE BESYLATE 5 MG/1
TABLET ORAL
Qty: 90 TAB | Refills: 2 | Status: SHIPPED | OUTPATIENT
Start: 2021-01-29 | End: 2021-08-09 | Stop reason: SDUPTHER

## 2021-01-29 RX ORDER — CHOLECALCIFEROL (VITAMIN D3) 125 MCG
CAPSULE ORAL
COMMUNITY

## 2021-01-29 RX ORDER — LISINOPRIL 40 MG/1
TABLET ORAL
Qty: 90 TAB | Refills: 2 | Status: SHIPPED | OUTPATIENT
Start: 2021-01-29 | End: 2021-08-09 | Stop reason: SDUPTHER

## 2021-01-29 ASSESSMENT — PATIENT HEALTH QUESTIONNAIRE - PHQ9: CLINICAL INTERPRETATION OF PHQ2 SCORE: 0

## 2021-01-30 NOTE — PROGRESS NOTES
"Subjective:   Olayinka Bartlett is a 55 y.o. male here today for hypertension, hyperlipidemia, lung nodule less than 6 cm and preventative health care.    Essential hypertension  This is a pleasant 55-year-old male accompanied by his wife, Gaby.  Here today to discuss his health.  Requesting refills of medications.  Takes lisinopril and amlodipine daily.  Blood pressure is stable today.    Lung nodule < 6cm on CT  Had a less than 6 mm nodule noted in his right lung field.  Quit smoking back in 2011.    Pure hypercholesterolemia  Chronic history of elevated cholesterol.  Takes simvastatin 10 mg daily.  Prior CT calcium score showed a score of 13.       Current medicines (including changes today)  Current Outpatient Medications   Medication Sig Dispense Refill   • lisinopril (PRINIVIL) 40 MG tablet TAKE ONE TABLET BY MOUTH DAILY 90 Tab 2   • amLODIPine (NORVASC) 5 MG Tab TAKE ONE TABLET BY MOUTH DAILY. 90 Tab 2   • simvastatin (ZOCOR) 10 MG Tab Take 1 Tab by mouth every evening. 90 Tab 2   • cyclobenzaprine (FLEXERIL) 10 mg Tab Take 1 Tab by mouth 3 times a day as needed. 30 Tab 1   • Cholecalciferol (VITAMIN D) 125 MCG (5000 UT) Cap Take  by mouth.     • aspirin (ASA) 81 MG CHEW Take 81 mg by mouth every day.       No current facility-administered medications for this visit.      He  has no past medical history on file.    Social History and Family History were reviewed and updated.    ROS   No chest pain, no shortness of breath, no abdominal pain and all other systems were reviewed and are negative.       Objective:     /86   Pulse 88   Temp 36.1 °C (97 °F) (Temporal)   Resp 16   Ht 1.651 m (5' 5\")   Wt 91.2 kg (201 lb)   SpO2 95%  Body mass index is 33.45 kg/m².   Physical Exam:  Constitutional: Alert, no distress.  Skin: Warm, dry, good turgor, no rashes in visible areas.  Eye: Equal, round and reactive, conjunctiva clear, lids normal.  ENMT: Lips without lesions, good dentition, oropharynx " clear.  Neck: Trachea midline, no masses.   Lymph: No cervical or supraclavicular lymphadenopathy  Respiratory: Unlabored respiratory effort, lungs clear to auscultation, no wheezes, no ronchi.  Cardiovascular: Normal S1, S2, no murmur, no edema.  Abdomen: Soft, non-tender, no masses.  Psych: Alert and oriented x3, normal affect and mood.        Assessment and Plan:   The following treatment plan was discussed    1. Essential hypertension  Chronic condition.  Stable.  Renewed lisinopril and amlodipine.  Order labs.  - lisinopril (PRINIVIL) 40 MG tablet; TAKE ONE TABLET BY MOUTH DAILY  Dispense: 90 Tab; Refill: 2  - amLODIPine (NORVASC) 5 MG Tab; TAKE ONE TABLET BY MOUTH DAILY.  Dispense: 90 Tab; Refill: 2    2. Pure hypercholesterolemia  Chronic condition.  Status unknown.  Renewed Zocor as directed.  Check cholesterol profile.  - simvastatin (ZOCOR) 10 MG Tab; Take 1 Tab by mouth every evening.  Dispense: 90 Tab; Refill: 2    3. Upper back pain on left side  Chronic condition.  Stable.  Requesting refill of Flexeril.  Sent over to his pharmacy.  - cyclobenzaprine (FLEXERIL) 10 mg Tab; Take 1 Tab by mouth 3 times a day as needed.  Dispense: 30 Tab; Refill: 1    4. Lung nodule < 6cm on CT  Reviewed CT scan.  Will check surveillance CT in September.  1 year surveillance.  Former smoker.    5. Varicella vaccination status unknown  Ordered varicella antibodies given unknown status of varicella.  Discussed need for Shingrix vaccination.  - VARICELLA ZOSTER IGG AB; Future    6. Preventative health care  Order labs fasting.  He will be contacted with the results.  - VITAMIN D,25 HYDROXY; Future  - Comp Metabolic Panel; Future  - Lipid Profile; Future  - HEMOGLOBIN A1C; Future    7. Need for influenza vaccination  Administer without complaints.  - Influenza Vaccine Quad Injection (PF)    8. Screening PSA (prostate specific antigen)  PSA ordered.  Screening.  - PROSTATE SPECIFIC AG SCREENING; Future      Followup: Return in  about 6 months (around 7/29/2021), or if symptoms worsen or fail to improve.    Please note that this dictation was created using voice recognition software. I have made every reasonable attempt to correct obvious errors, but I expect that there are errors of grammar and possibly content that I did not discover before finalizing the note.

## 2021-01-30 NOTE — ASSESSMENT & PLAN NOTE
This is a pleasant 55-year-old male accompanied by his wife, Gaby.  Here today to discuss his health.  Requesting refills of medications.  Takes lisinopril and amlodipine daily.  Blood pressure is stable today.

## 2021-01-30 NOTE — ASSESSMENT & PLAN NOTE
Chronic history of elevated cholesterol.  Takes simvastatin 10 mg daily.  Prior CT calcium score showed a score of 13.

## 2021-04-03 ENCOUNTER — IMMUNIZATION (OUTPATIENT)
Dept: FAMILY PLANNING/WOMEN'S HEALTH CLINIC | Facility: IMMUNIZATION CENTER | Age: 56
End: 2021-04-03
Payer: COMMERCIAL

## 2021-04-03 DIAGNOSIS — Z23 ENCOUNTER FOR VACCINATION: Primary | ICD-10-CM

## 2021-04-03 PROCEDURE — 91300 PFIZER SARS-COV-2 VACCINE: CPT

## 2021-04-03 PROCEDURE — 0001A PFIZER SARS-COV-2 VACCINE: CPT

## 2021-04-22 ENCOUNTER — TELEPHONE (OUTPATIENT)
Dept: MEDICAL GROUP | Facility: MEDICAL CENTER | Age: 56
End: 2021-04-22

## 2021-04-22 NOTE — TELEPHONE ENCOUNTER
ESTABLISHED PATIENT PRE-VISIT PLANNING     Patient was NOT contacted to complete PVP.     Note: Patient will not be contacted if there is no indication to call.     1.  Reviewed notes from the last few office visits within the medical group: Yes    2.  If any orders were placed at last visit or intended to be done for this visit (i.e. 6 mos follow-up), do we have Results/Consult Notes?         •  Labs - Labs ordered, but not to be completed until 07/2021.  Note: If patient appointment is for lab review and patient did not complete labs, check with provider if OK to reschedule patient until labs completed.       •  Imaging - Imaging was not ordered at last office visit.       •  Referrals - No referrals were ordered at last office visit.    3. Is this appointment scheduled as a Hospital Follow-Up? No    4.  Immunizations were updated in Epic using Reconcile Outside Information activity? Yes    5.  Patient is due for the following Health Maintenance Topics:   Health Maintenance Due   Topic Date Due   • IMM DTaP/Tdap/Td Vaccine (1 - Tdap) 05/13/1986   • IMM ZOSTER VACCINES (1 of 2) Never done   • COVID-19 Vaccine (2 - Pfizer 2-dose series) 04/24/2021

## 2021-04-23 ENCOUNTER — IMMUNIZATION (OUTPATIENT)
Dept: FAMILY PLANNING/WOMEN'S HEALTH CLINIC | Facility: IMMUNIZATION CENTER | Age: 56
End: 2021-04-23
Payer: COMMERCIAL

## 2021-04-23 DIAGNOSIS — Z23 ENCOUNTER FOR VACCINATION: Primary | ICD-10-CM

## 2021-04-23 PROCEDURE — 0002A PFIZER SARS-COV-2 VACCINE: CPT | Performed by: NURSE PRACTITIONER

## 2021-04-23 PROCEDURE — 91300 PFIZER SARS-COV-2 VACCINE: CPT | Performed by: NURSE PRACTITIONER

## 2021-04-26 ENCOUNTER — APPOINTMENT (OUTPATIENT)
Dept: MEDICAL GROUP | Facility: MEDICAL CENTER | Age: 56
End: 2021-04-26
Payer: COMMERCIAL

## 2021-06-29 DIAGNOSIS — M54.9 UPPER BACK PAIN ON LEFT SIDE: ICD-10-CM

## 2021-08-07 ENCOUNTER — HOSPITAL ENCOUNTER (OUTPATIENT)
Dept: LAB | Facility: MEDICAL CENTER | Age: 56
End: 2021-08-07
Attending: OPHTHALMOLOGY
Payer: COMMERCIAL

## 2021-08-07 DIAGNOSIS — Z12.5 SCREENING PSA (PROSTATE SPECIFIC ANTIGEN): ICD-10-CM

## 2021-08-07 DIAGNOSIS — Z78.9 VARICELLA VACCINATION STATUS UNKNOWN: ICD-10-CM

## 2021-08-07 DIAGNOSIS — Z00.00 PREVENTATIVE HEALTH CARE: ICD-10-CM

## 2021-08-07 LAB
25(OH)D3 SERPL-MCNC: 59 NG/ML (ref 30–100)
ALBUMIN SERPL BCP-MCNC: 4.3 G/DL (ref 3.2–4.9)
ALBUMIN/GLOB SERPL: 1.3 G/DL
ALP SERPL-CCNC: 49 U/L (ref 30–99)
ALT SERPL-CCNC: 61 U/L (ref 2–50)
ANION GAP SERPL CALC-SCNC: 14 MMOL/L (ref 7–16)
AST SERPL-CCNC: 30 U/L (ref 12–45)
BILIRUB SERPL-MCNC: 0.4 MG/DL (ref 0.1–1.5)
BUN SERPL-MCNC: 12 MG/DL (ref 8–22)
CALCIUM SERPL-MCNC: 8.9 MG/DL (ref 8.5–10.5)
CHLORIDE SERPL-SCNC: 100 MMOL/L (ref 96–112)
CHOLEST SERPL-MCNC: 169 MG/DL (ref 100–199)
CO2 SERPL-SCNC: 23 MMOL/L (ref 20–33)
CREAT SERPL-MCNC: 0.83 MG/DL (ref 0.5–1.4)
EST. AVERAGE GLUCOSE BLD GHB EST-MCNC: 123 MG/DL
GLOBULIN SER CALC-MCNC: 3.3 G/DL (ref 1.9–3.5)
GLUCOSE SERPL-MCNC: 93 MG/DL (ref 65–99)
HBA1C MFR BLD: 5.9 % (ref 4–5.6)
HDLC SERPL-MCNC: 60 MG/DL
LDLC SERPL CALC-MCNC: 92 MG/DL
POTASSIUM SERPL-SCNC: 4.3 MMOL/L (ref 3.6–5.5)
PROT SERPL-MCNC: 7.6 G/DL (ref 6–8.2)
PSA SERPL-MCNC: 1.12 NG/ML (ref 0–4)
SODIUM SERPL-SCNC: 137 MMOL/L (ref 135–145)
TRIGL SERPL-MCNC: 86 MG/DL (ref 0–149)

## 2021-08-07 PROCEDURE — 83036 HEMOGLOBIN GLYCOSYLATED A1C: CPT

## 2021-08-07 PROCEDURE — 80053 COMPREHEN METABOLIC PANEL: CPT

## 2021-08-07 PROCEDURE — 80061 LIPID PANEL: CPT

## 2021-08-07 PROCEDURE — 84153 ASSAY OF PSA TOTAL: CPT

## 2021-08-07 PROCEDURE — 82306 VITAMIN D 25 HYDROXY: CPT

## 2021-08-07 PROCEDURE — 86787 VARICELLA-ZOSTER ANTIBODY: CPT

## 2021-08-07 PROCEDURE — 36415 COLL VENOUS BLD VENIPUNCTURE: CPT

## 2021-08-09 ENCOUNTER — OFFICE VISIT (OUTPATIENT)
Dept: MEDICAL GROUP | Facility: MEDICAL CENTER | Age: 56
End: 2021-08-09
Payer: COMMERCIAL

## 2021-08-09 VITALS
BODY MASS INDEX: 33.59 KG/M2 | OXYGEN SATURATION: 96 % | HEIGHT: 65 IN | DIASTOLIC BLOOD PRESSURE: 68 MMHG | WEIGHT: 201.6 LBS | SYSTOLIC BLOOD PRESSURE: 118 MMHG | TEMPERATURE: 98 F | HEART RATE: 98 BPM

## 2021-08-09 DIAGNOSIS — R06.83 SNORING: ICD-10-CM

## 2021-08-09 DIAGNOSIS — I10 ESSENTIAL HYPERTENSION: ICD-10-CM

## 2021-08-09 DIAGNOSIS — M54.9 UPPER BACK PAIN ON LEFT SIDE: ICD-10-CM

## 2021-08-09 DIAGNOSIS — E78.00 PURE HYPERCHOLESTEROLEMIA: ICD-10-CM

## 2021-08-09 DIAGNOSIS — R73.03 PREDIABETES: ICD-10-CM

## 2021-08-09 DIAGNOSIS — R53.83 FATIGUE, UNSPECIFIED TYPE: ICD-10-CM

## 2021-08-09 PROBLEM — Z78.9 VARICELLA VACCINATION STATUS UNKNOWN: Status: RESOLVED | Noted: 2020-07-31 | Resolved: 2021-08-09

## 2021-08-09 PROBLEM — R09.81 NASAL CONGESTION: Status: RESOLVED | Noted: 2020-01-24 | Resolved: 2021-08-09

## 2021-08-09 LAB — VZV IGG SER IA-ACNC: 2.04

## 2021-08-09 PROCEDURE — 99214 OFFICE O/P EST MOD 30 MIN: CPT | Performed by: PHYSICIAN ASSISTANT

## 2021-08-09 RX ORDER — AMLODIPINE BESYLATE 5 MG/1
TABLET ORAL
Qty: 90 TABLET | Refills: 2 | Status: SHIPPED | OUTPATIENT
Start: 2021-08-09 | End: 2022-01-19 | Stop reason: SDUPTHER

## 2021-08-09 RX ORDER — LISINOPRIL 40 MG/1
TABLET ORAL
Qty: 90 TABLET | Refills: 2 | Status: SHIPPED | OUTPATIENT
Start: 2021-08-09 | End: 2022-01-28 | Stop reason: SDUPTHER

## 2021-08-09 RX ORDER — SIMVASTATIN 10 MG
10 TABLET ORAL EVERY EVENING
Qty: 90 TABLET | Refills: 2 | Status: SHIPPED | OUTPATIENT
Start: 2021-08-09 | End: 2022-10-24

## 2021-08-09 NOTE — ASSESSMENT & PLAN NOTE
Chronic condition.  Takes Zocor 10 mg daily.  Labs performed earlier were good.  Cholesterol profile in normal range.

## 2021-08-09 NOTE — ASSESSMENT & PLAN NOTE
Chronic, intermittent condition.  Takes Flexeril as needed.  Flexeril does cause fatigue.  Extended fatigue.  Symptoms are stable.

## 2021-08-09 NOTE — ASSESSMENT & PLAN NOTE
On a CT scan there was a 1.3 mm upper left lobe nodule noted almost a year ago.  History of smoking in remission.  Noted to have a surveillance CT next month.

## 2021-08-09 NOTE — ASSESSMENT & PLAN NOTE
This is a pleasant 56-year-old male here today to follow-up on his health.  Couple times his blood pressure has been elevated.  This has been performed at home.  He is taking his medication both lisinopril 40 mg and amlodipine 5 mg.

## 2021-08-09 NOTE — ASSESSMENT & PLAN NOTE
Chronic condition of fatigue.  Especially when he returns home from work.  Few years ago was noted about his snoring but it appears that he did not follow-up with ENT.  Denies any known witnessed apneic spells.

## 2021-08-09 NOTE — PROGRESS NOTES
Subjective:   Olayinka Bartlett is a 56 y.o. male here today for hypertension, hypercholesterolemia, prediabetes and fatigue with history of snoring.    Essential hypertension  This is a pleasant 56-year-old male here today to follow-up on his health.  Couple times his blood pressure has been elevated.  This has been performed at home.  He is taking his medication both lisinopril 40 mg and amlodipine 5 mg.    Pure hypercholesterolemia  Chronic condition.  Takes Zocor 10 mg daily.  Labs performed earlier were good.  Cholesterol profile in normal range.    Prediabetes  A1c a couple years ago was at 5.9.  Turns out it was at 5.9 today.    Upper back pain on left side  Chronic, intermittent condition.  Takes Flexeril as needed.  Flexeril does cause fatigue.  Extended fatigue.  Symptoms are stable.    Lung nodule < 6cm on CT  On a CT scan there was a 1.3 mm upper left lobe nodule noted almost a year ago.  History of smoking in remission.  Noted to have a surveillance CT next month.    Fatigue  Chronic condition of fatigue.  Especially when he returns home from work.  Few years ago was noted about his snoring but it appears that he did not follow-up with ENT.  Denies any known witnessed apneic spells.       Current medicines (including changes today)  Current Outpatient Medications   Medication Sig Dispense Refill   • lisinopril (PRINIVIL) 40 MG tablet TAKE ONE TABLET BY MOUTH DAILY 90 tablet 2   • amLODIPine (NORVASC) 5 MG Tab TAKE ONE TABLET BY MOUTH DAILY. 90 tablet 2   • simvastatin (ZOCOR) 10 MG Tab Take 1 tablet by mouth every evening. 90 tablet 2   • cyclobenzaprine (FLEXERIL) 10 mg Tab Take 1 Tab by mouth 3 times a day as needed. 30 Tab 1   • Cholecalciferol (VITAMIN D) 125 MCG (5000 UT) Cap Take  by mouth.     • aspirin (ASA) 81 MG CHEW Take 81 mg by mouth every day.       No current facility-administered medications for this visit.     He  has no past medical history on file.    Social History and Family  "History were reviewed and updated.    ROS   No chest pain, no shortness of breath, no abdominal pain and all other systems were reviewed and are negative.       Objective:     /68   Pulse 98   Temp 36.7 °C (98 °F) (Temporal)   Ht 1.651 m (5' 5\")   Wt 91.4 kg (201 lb 9.6 oz)   SpO2 96%  Body mass index is 33.55 kg/m².   Physical Exam:  Constitutional: Alert, no distress.  Skin: Warm, dry, good turgor, no rashes in visible areas.  Eye: Equal, round and reactive, conjunctiva clear, lids normal.  ENMT: Lips without lesions, good dentition, oropharynx clear.  Neck: Trachea midline, no masses.   Lymph: No cervical or supraclavicular lymphadenopathy  Respiratory: Unlabored respiratory effort, lungs appear clear, no wheezes.  Cardiovascular: Regular rate rhythm.  Psych: Alert and oriented x3, normal affect and mood.        Assessment and Plan:   The following treatment plan was discussed    1. Essential hypertension  Chronic condition.  Stable.  Repeated blood pressure which was in normal range.  Renew lisinopril and amlodipine as directed.  - lisinopril (PRINIVIL) 40 MG tablet; TAKE ONE TABLET BY MOUTH DAILY  Dispense: 90 tablet; Refill: 2  - amLODIPine (NORVASC) 5 MG Tab; TAKE ONE TABLET BY MOUTH DAILY.  Dispense: 90 tablet; Refill: 2    2. Pure hypercholesterolemia  Chronic condition.  Controlled.  Renewed Zocor as directed.  Reviewed labs.  - simvastatin (ZOCOR) 10 MG Tab; Take 1 tablet by mouth every evening.  Dispense: 90 tablet; Refill: 2    3. Prediabetes  Chronic condition.  Stable.  A1c still at 5.9.  Labs reviewed.    4. Snoring  Chronic condition.  Referred to sleep medicine to be evaluated for sleep apnea.  - REFERRAL TO PULMONARY AND SLEEP MEDICINE    5. Fatigue, unspecified type  New condition noted in chart but chronic.  Referred to sleep medicine for sleep study.  - REFERRAL TO PULMONARY AND SLEEP MEDICINE    6. Lung nodule < 6cm on CT  Reviewed CT lung results last year and ordered CT chest " without contrast.  Contact imaging regarding scheduling and cost.  - CT-CHEST (THORAX) W/O; Future    7. Upper back pain on left side  Chronic condition.  Stable.  Continue Flexeril but advised to cut the tablets in half if he deals with fatigue.  No refill needed today.         Followup: Return in about 6 months (around 2/9/2022), or if symptoms worsen or fail to improve.    Please note that this dictation was created using voice recognition software. I have made every reasonable attempt to correct obvious errors, but I expect that there are errors of grammar and possibly content that I did not discover before finalizing the note.

## 2021-09-28 ENCOUNTER — HOSPITAL ENCOUNTER (OUTPATIENT)
Dept: RADIOLOGY | Facility: MEDICAL CENTER | Age: 56
End: 2021-09-28
Attending: PHYSICIAN ASSISTANT
Payer: COMMERCIAL

## 2021-09-28 PROCEDURE — 71250 CT THORAX DX C-: CPT

## 2021-11-09 ENCOUNTER — HOSPITAL ENCOUNTER (EMERGENCY)
Facility: MEDICAL CENTER | Age: 56
End: 2021-11-09
Attending: EMERGENCY MEDICINE | Admitting: EMERGENCY MEDICINE
Payer: OTHER MISCELLANEOUS

## 2021-11-09 VITALS
BODY MASS INDEX: 34.86 KG/M2 | DIASTOLIC BLOOD PRESSURE: 110 MMHG | HEART RATE: 86 BPM | SYSTOLIC BLOOD PRESSURE: 153 MMHG | TEMPERATURE: 97.5 F | HEIGHT: 65 IN | RESPIRATION RATE: 16 BRPM | WEIGHT: 209.22 LBS | OXYGEN SATURATION: 93 %

## 2021-11-09 DIAGNOSIS — H11.32 SUBCONJUNCTIVAL HEMORRHAGE OF LEFT EYE: ICD-10-CM

## 2021-11-09 DIAGNOSIS — S05.8X2A ABRASION OF SCLERA OF LEFT EYE, INITIAL ENCOUNTER: ICD-10-CM

## 2021-11-09 PROCEDURE — 700111 HCHG RX REV CODE 636 W/ 250 OVERRIDE (IP): Performed by: EMERGENCY MEDICINE

## 2021-11-09 PROCEDURE — 90715 TDAP VACCINE 7 YRS/> IM: CPT | Performed by: EMERGENCY MEDICINE

## 2021-11-09 PROCEDURE — 90471 IMMUNIZATION ADMIN: CPT

## 2021-11-09 PROCEDURE — 700101 HCHG RX REV CODE 250: Performed by: EMERGENCY MEDICINE

## 2021-11-09 PROCEDURE — 99283 EMERGENCY DEPT VISIT LOW MDM: CPT

## 2021-11-09 RX ORDER — PROPARACAINE HYDROCHLORIDE 5 MG/ML
1 SOLUTION/ DROPS OPHTHALMIC ONCE
Status: COMPLETED | OUTPATIENT
Start: 2021-11-09 | End: 2021-11-09

## 2021-11-09 RX ORDER — ERYTHROMYCIN 5 MG/G
OINTMENT OPHTHALMIC ONCE
Status: COMPLETED | OUTPATIENT
Start: 2021-11-09 | End: 2021-11-09

## 2021-11-09 RX ADMIN — ERYTHROMYCIN: 5 OINTMENT OPHTHALMIC at 17:27

## 2021-11-09 RX ADMIN — FLUORESCEIN SODIUM 1 MG: 1 STRIP OPHTHALMIC at 16:30

## 2021-11-09 RX ADMIN — CLOSTRIDIUM TETANI TOXOID ANTIGEN (FORMALDEHYDE INACTIVATED), CORYNEBACTERIUM DIPHTHERIAE TOXOID ANTIGEN (FORMALDEHYDE INACTIVATED), BORDETELLA PERTUSSIS TOXOID ANTIGEN (GLUTARALDEHYDE INACTIVATED), BORDETELLA PERTUSSIS FILAMENTOUS HEMAGGLUTININ ANTIGEN (FORMALDEHYDE INACTIVATED), BORDETELLA PERTUSSIS PERTACTIN ANTIGEN, AND BORDETELLA PERTUSSIS FIMBRIAE 2/3 ANTIGEN 0.5 ML: 5; 2; 2.5; 5; 3; 5 INJECTION, SUSPENSION INTRAMUSCULAR at 16:24

## 2021-11-09 RX ADMIN — PROPARACAINE HYDROCHLORIDE 1 DROP: 5 SOLUTION/ DROPS OPHTHALMIC at 16:30

## 2021-11-09 NOTE — LETTER
"  FORM C-4:  EMPLOYEE’S CLAIM FOR COMPENSATION/ REPORT OF INITIAL TREATMENT  EMPLOYEE’S CLAIM - PROVIDE ALL INFORMATION REQUESTED   First Name Olayinka Last Name Tri Birthdate 1965  Sex male Claim Number   Home Address [address]   [facility] [place]             Zip [address]                                   Age  56 y.o. Height  1.651 m (5' 5\") Weight  94.9 kg (209 lb 3.5 oz) [facility]     Mailing Address [address]  [place]              Zip [address] Telephone  [phone] (home)  Primary Language Spoken English   Insurer   Third Party   [employer] Employee's Occupation (Job Title) When Injury or Occupational Disease Occurred     Employer's Name [employer] Telephone [phone]    Employer Address [address] [facility] [address]] Zip [address]   Date of Injury  11/9/2021       Hour of Injury  12:30 PM Date Employer Notified  11/9/2021 Last Day of Work after Injury or Occupational Disease  11/9/2021 Supervisor to Whom Injury Reported  Edilia Nicholas   Address or Location of Accident (if applicable) [address]   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 Be specific and answer in detail. Use additional sheet if necessary)  Was using a zip tie to strap valve down to strap valve down to a pallet, but zip tie snapped and went   If you believe that you have an occupational disease, when did you first have knowledge of the disability and it relationship to your employment? No Witnesses to the Accident  N/A   Nature of Injury or Occupational Disease  Workers' Compensation Part(s) of Body Injured or Affected  Eye (L), N/A, N/A    I CERTIFY THAT THE ABOVE IS TRUE AND CORRECT TO THE BEST OF MY KNOWLEDGE AND THAT I HAVE PROVIDED THIS INFORMATION IN ORDER TO OBTAIN THE BENEFITS OF [place] AND " [place]  I HEREBY AUTHORIZE ANY PHYSICIAN, CHIROPRACTOR, SURGEON, PRACTITIONER, OR OTHER PERSON, ANY HOSPITAL, INCLUDING [facility] OR [facility], ANY MEDICAL SERVICE ORGANIZATION, ANY INSURANCE COMPANY, OR OTHER INSTITUTION OR ORGANIZATION TO RELEASE TO EACH OTHER, ANY MEDICAL OR OTHER INFORMATION, INCLUDING BENEFITS PAID OR PAYABLE, PERTINENT TO THIS INJURY OR DISEASE, EXCEPT INFORMATION RELATIVE TO DIAGNOSIS, TREATMENT AND/OR COUNSELING FOR AIDS, PSYCHOLOGICAL CONDITIONS, ALCOHOL OR CONTROLLED SUBSTANCES, FOR WHICH I MUST GIVE SPECIFIC AUTHORIZATION.  A PHOTOSTAT OF THIS AUTHORIZATION SHALL BE AS VALID AS THE ORIGINAL.  Date         11/9/2021              Place       [facility]                                                                      Employee’s Signature   THIS REPORT MUST BE COMPLETED AND MAILED WITHIN 3 WORKING DAYS OF TREATMENT   Place [facility], EMERGENCY DEPT                       Name of Facility [facility]   Date  11/9/2021 Diagnosis  (H11.32) Subconjunctival hemorrhage of left eye  (S05.8X2A) Abrasion of sclera of left eye, initial encounter Is there evidence the injured employee was under the influence of alcohol and/or another controlled substance at the time of accident?   Hour  5:37 PM Description of Injury or Disease  Subconjunctival hemorrhage of left eye  Abrasion of sclera of left eye, initial encounter No   Treatment  Physician evaluation and treatment of left eye injury  Have you advised the patient to remain off work five days or more?         No   X-Ray Findings  Negative If Yes   From Date    To Date      From information given by the employee, together with medical evidence, can you directly connect this injury or occupational disease as job incurred? Yes If No, is employee capable of: Full Duty  Yes Modified Duty      Is additional medical  "care by a physician indicated? Yes  Comments:Follow-up with ophthalmology If Modified Duty, Specify any Limitations / Restrictions       Do you know of any previous injury or disease contributing to this condition or occupational disease? No    Date 11/9/2021 Print Doctor’s Name Yoel De I certify the employer’s copy of this form was mailed on:   Address [address] [id number] [phone] INSURER’S USE ONLY   Provider’s Tax ID Number [id number] Telephone Dept: [phone]    Doctor’s Signature e-YOEL Brunner M.D. Degree  MD      Form C-4 (rev.10/07)                                                                         BRIEF DESCRIPTION OF RIGHTS AND BENEFITS  (Pursuant to [place] 616[place])    Notice of Injury or Occupational Disease (Incident Report Form C-1): If an injury or occupational disease (OD) arises out of and in the course of employment, you must provide written notice to your employer as soon as practicable, but no later than 7 days after the accident or OD. Your employer shall maintain a sufficient supply of the required forms.    Claim for Compensation (Form C-4): If medical treatment is sought, the form C-4 is available at the place of initial treatment. A completed \"Claim for Compensation\" (Form C-4) must be filed within 90 days after an accident or OD. The treating physician or chiropractor must, within 3 working days after treatment, complete and mail to the employer, the employer's insurer and third-party , the Claim for Compensation.    Medical Treatment: If you require medical treatment for your on-the-job injury or OD, you may be required to select a physician or chiropractor from a list provided by your workers’ compensation insurer, if it has contracted with an Organization for Managed Care (MCO) or Preferred Provider Organization (PPO) or providers of health care. If your employer has not entered into a contract with an MCO or PPO, you may select a " physician or chiropractor from the Panel of Physicians and Chiropractors. Any medical costs related to your industrial injury or OD will be paid by your insurer.    Temporary Total Disability (TTD): If your doctor has certified that you are unable to work for a period of at least 5 consecutive days, or 5 cumulative days in a 20-day period, or places restrictions on you that your employer does not accommodate, you may be entitled to TTD compensation.    Temporary Partial Disability (TPD): If the wage you receive upon reemployment is less than the compensation for TTD to which you are entitled, the insurer may be required to pay you TPD compensation to make up the difference. TPD can only be paid for a maximum of 24 months.    Permanent Partial Disability (PPD): When your medical condition is stable and there is an indication of a PPD as a result of your injury or OD, within 30 days, your insurer must arrange for an evaluation by a rating physician or chiropractor to determine the degree of your PPD. The amount of your PPD award depends on the date of injury, the results of the PPD evaluation, your age and wage.    Permanent Total Disability (PTD): If you are medically certified by a treating physician or chiropractor as permanently and totally disabled and have been granted a PTD status by your insurer, you are entitled to receive monthly benefits not to exceed 66 2/3% of your average monthly wage. The amount of your PTD payments is subject to reduction if you previously received a lump-sum PPD award.    Vocational Rehabilitation Services: You may be eligible for vocational rehabilitation services if you are unable to return to the job due to a permanent physical impairment or permanent restrictions as a result of your injury or occupational disease.    Transportation and Per Afsaneh Reimbursement: You may be eligible for travel expenses and per afsaneh associated with medical treatment.    Reopening: You may be able to  reopen your claim if your condition worsens after claim closure.     Appeal Process: If you disagree with a written determination issued by the insurer or the insurer does not respond to your request, you may appeal to the Department of Administration, , by following the instructions contained in your determination letter. You must appeal the determination within 70 days from the date of the determination letter at [address], [address], [place], [place] [address], or [address], [address], [place], [place] [address]. If you disagree with the  decision, you may appeal to the Department of Administration, . You must file your appeal within 30 days from the date of the  decision letter at [address], [address], [place], [place] [address], or [address], [place], [place] [address]. If you disagree with a decision of an , you may file a petition for judicial review with the District Court. You must do so within 30 days of the Appeal Officer’s decision. You may be represented by an  at your own expense or you may contact the [facility] for possible representation.    [place]  for Injured Workers (NAIW): If you disagree with a  decision, you may request that NAIW represent you without charge at an  Hearing. For information regarding denial of benefits, you may contact the [facility] at: [address], [address], [place] [address], [phone], or [address], [place] [address], [phone]    To File a Complaint with the Division: If you wish to file a complaint with the  of the Division of Industrial Relations (DIR),  please contact the Workers’ Compensation Section, [address], [place], [place] [address], telephone [phone], or [address], [place], [place] [address],  telephone [phone].    For assistance with Workers’ Compensation Issues: You may contact the [facility] Office for Consumer Health Assistance, [facility] [facility], [address], [place] [address], Toll Free 1-238.950.5331, Web site: http://[facility].[place].gov/Programs/TYLER E-mail: tyler@[facility].nv.gov  D-2 (rev. 10/20)              __________________________________________________________________                                    ______11/9/2021___________            Employee Name / Signature                                                                                                                            Date

## 2021-11-10 NOTE — ED PROVIDER NOTES
ED Provider Note    CHIEF COMPLAINT  Chief Complaint   Patient presents with   • Eye Injury     about 12:30 hit in L eye w/ zip tie  having pain redness blurred vision       HPI  Olayinka Bartlett is a 56 y.o. male who presents for evaluation of acute injury to the left eye.  The patient is accompanied by his boss.  He was at work and approximately 3 and half hours prior to arrival a zip tie struck him in the left lateral aspect of the left eye.  He denies any loss of consciousness.  He does report some transient blurry vision.  He has no ocular surgical history and does not wear contact lenses.  No other injuries reported.  Tetanus is not up to date.    REVIEW OF SYSTEMS  See HPI for further details. All other systems are negative.     PAST MEDICAL HISTORY  Past Medical History:   Diagnosis Date   • CAD (coronary artery disease)    • Hypertension        FAMILY HISTORY  Noncontributory    SOCIAL HISTORY  Social History     Socioeconomic History   • Marital status:      Spouse name: Not on file   • Number of children: Not on file   • Years of education: Not on file   • Highest education level: Not on file   Occupational History   • Not on file   Tobacco Use   • Smoking status: Former Smoker     Years: 20.00     Quit date: 1/1/2011     Years since quitting: 10.8   • Smokeless tobacco: Never Used   Substance and Sexual Activity   • Alcohol use: Yes     Alcohol/week: 3.6 oz     Types: 6 Cans of beer per week   • Drug use: No   • Sexual activity: Yes     Partners: Female     Birth control/protection: Sponge     Comment: , 3 children, 2 steps, 2 grands   Other Topics Concern   • Not on file   Social History Narrative   • Not on file     Social Determinants of Health     Financial Resource Strain:    • Difficulty of Paying Living Expenses: Not on file   Food Insecurity:    • Worried About Running Out of Food in the Last Year: Not on file   • Ran Out of Food in the Last Year: Not on file  "  Transportation Needs:    • Lack of Transportation (Medical): Not on file   • Lack of Transportation (Non-Medical): Not on file   Physical Activity:    • Days of Exercise per Week: Not on file   • Minutes of Exercise per Session: Not on file   Stress:    • Feeling of Stress : Not on file   Social Connections:    • Frequency of Communication with Friends and Family: Not on file   • Frequency of Social Gatherings with Friends and Family: Not on file   • Attends Sabianism Services: Not on file   • Active Member of Clubs or Organizations: Not on file   • Attends Club or Organization Meetings: Not on file   • Marital Status: Not on file   Intimate Partner Violence:    • Fear of Current or Ex-Partner: Not on file   • Emotionally Abused: Not on file   • Physically Abused: Not on file   • Sexually Abused: Not on file   Housing Stability:    • Unable to Pay for Housing in the Last Year: Not on file   • Number of Places Lived in the Last Year: Not on file   • Unstable Housing in the Last Year: Not on file     Reported drug or alcohol abuse  SURGICAL HISTORY  No past surgical history on file.    CURRENT MEDICATIONS  Home Medications     Reviewed by Afsaneh Brumfield R.N. (Registered Nurse) on 11/09/21 at [facility] (by [email or web address])  Med List Status: Not Addressed   Medication Last Dose Status   amLODIPine (NORVASC) 5 MG Tab  Active   aspirin (ASA) 81 MG CHEW  Active   Cholecalciferol (VITAMIN D) 125 MCG (5000 UT) Cap  Active   cyclobenzaprine (FLEXERIL) 10 mg Tab  Active   lisinopril (PRINIVIL) 40 MG tablet  Active   simvastatin (ZOCOR) 10 MG Tab  Active                ALLERGIES  Allergies   Allergen Reactions   • Bloodless Unspecified     [employer]       PHYSICAL EXAM  VITAL SIGNS: /113   Pulse 93   Temp 36.5 °C (97.7 °F) (Temporal)   Resp 16   Ht 1.651 m (5' 5\")   Wt 94.9 kg (209 lb 3.5 oz)   SpO2 96%   BMI 34.82 kg/m²       Constitutional: Well developed, Well nourished, No acute distress, Non-toxic appearance.   HENT: " Normocephalic, Atraumatic, Bilateral external ears normal, Oropharynx moist, No oral exudates, Nose normal.   Eyes: Pupils are 3 mm equal symmetric and round.  Left lateral sclera has a subconjunctival hemorrhage with both superficial small tear in the sclera with no suggestion of globe rupture.  Foreign body.   Neck: Normal range of motion, No tenderness, Supple, No stridor.   Cardiovascular: Normal heart rate, Normal rhythm, No murmurs, No rubs, No gallops.   Thorax & Lungs: Normal breath sounds, No respiratory distress, No wheezing, No chest tenderness.   Abdomen: Bowel sounds normal, Soft, No tenderness, No masses, No pulsatile masses.   Skin: Warm, Dry, No erythema, No rash.   Extremities: Intact distal pulses, No edema, No tenderness, No cyanosis, No clubbing.   Neurologic: Alert & oriented x 3, Normal motor function, Normal sensory function, No focal deficits noted.   Psychiatric: Affect normal, Judgment normal, Mood normal.     Physician procedure: Lamp exam with fluorescein.  Proparacaine was instilled in the left eye.  Patient had immediate pain relief.  Fluorescein was instilled into the left eye.  There is no uptake overlying the cornea or foreign body.  There was some uptake in the lateral left eye where there appeared to be a superficial scleral tear.  There is no leakage of aqueous humor to suggest globe rupture.  No foreign body    COURSE & MEDICAL DECISION MAKING  Pertinent Labs & Imaging studies reviewed. (See chart for details)  I reviewed the case with Dr. Leroy.  His visual acuity is 20/25 in the affected eye and 20/25 in the unaffected right eye.  He does not appear to have any obvious corneal abrasion or foreign body.  He has a superficial scleral tear with no suggestion of globe rupture.  Ophthalmology is recommended starting topical antibiotic ointment and they will follow with the patient in the office in 48 hours    FINAL IMPRESSION  1.  Subconjunctival hemorrhage, left eye  2.   Superficial scleral tear of the left eye         Electronically signed by: Yoel De M.D., 11/9/2021 4:05 PM

## 2021-11-10 NOTE — DISCHARGE INSTRUCTIONS
Apply topical antibiotic 4 times daily as discussed.  Follow-up with ophthalmology within 48 hours

## 2022-01-19 ENCOUNTER — OFFICE VISIT (OUTPATIENT)
Dept: MEDICAL GROUP | Facility: MEDICAL CENTER | Age: 57
End: 2022-01-19
Payer: COMMERCIAL

## 2022-01-19 VITALS
BODY MASS INDEX: 33.66 KG/M2 | SYSTOLIC BLOOD PRESSURE: 129 MMHG | WEIGHT: 202 LBS | HEIGHT: 65 IN | OXYGEN SATURATION: 95 % | RESPIRATION RATE: 14 BRPM | HEART RATE: 63 BPM | TEMPERATURE: 98.9 F | DIASTOLIC BLOOD PRESSURE: 71 MMHG

## 2022-01-19 DIAGNOSIS — I10 ESSENTIAL HYPERTENSION: ICD-10-CM

## 2022-01-19 PROCEDURE — 99213 OFFICE O/P EST LOW 20 MIN: CPT | Performed by: PHYSICIAN ASSISTANT

## 2022-01-19 RX ORDER — AMLODIPINE BESYLATE 10 MG/1
TABLET ORAL
Qty: 90 TABLET | Refills: 3 | Status: SHIPPED | OUTPATIENT
Start: 2022-01-19 | End: 2022-11-04 | Stop reason: SDUPTHER

## 2022-01-19 ASSESSMENT — PATIENT HEALTH QUESTIONNAIRE - PHQ9: CLINICAL INTERPRETATION OF PHQ2 SCORE: 0

## 2022-01-20 NOTE — ASSESSMENT & PLAN NOTE
This is a pleasant 56-year-old male accompanied by his wife, Gaby.  Here today to discuss blood pressure.  Blood pressure has been elevated.  Typically 140/90.  He does have some left-sided neck discomfort which he is unsure if that may be attributed to blood pressure.  He does have an appointment soon with chiropractor.  Based upon the readings I increased amlodipine to 10 mg.  He has been taking it for few days and it appears that it has improved his blood pressure readings at home.  He is also on lisinopril 40 mg.

## 2022-01-20 NOTE — PROGRESS NOTES
"Subjective:   Olayinka Bartlett is a 56 y.o. male here today for hypertension.    Essential hypertension  This is a pleasant 56-year-old male accompanied by his wife, Gaby.  Here today to discuss blood pressure.  Blood pressure has been elevated.  Typically 140/90.  He does have some left-sided neck discomfort which he is unsure if that may be attributed to blood pressure.  He does have an appointment soon with chiropractor.  Based upon the readings I increased amlodipine to 10 mg.  He has been taking it for few days and it appears that it has improved his blood pressure readings at home.  He is also on lisinopril 40 mg.       Current medicines (including changes today)  Current Outpatient Medications   Medication Sig Dispense Refill   • amLODIPine (NORVASC) 10 MG Tab TAKE ONE TABLET BY MOUTH DAILY. 90 Tablet 3   • lisinopril (PRINIVIL) 40 MG tablet TAKE ONE TABLET BY MOUTH DAILY 90 tablet 2   • simvastatin (ZOCOR) 10 MG Tab Take 1 tablet by mouth every evening. 90 tablet 2   • cyclobenzaprine (FLEXERIL) 10 mg Tab Take 1 Tab by mouth 3 times a day as needed. 30 Tab 1   • Cholecalciferol (VITAMIN D) 125 MCG (5000 UT) Cap Take  by mouth.     • aspirin (ASA) 81 MG CHEW Take 81 mg by mouth every day.       No current facility-administered medications for this visit.     He  has a past medical history of CAD (coronary artery disease) and Hypertension.    Social History and Family History were reviewed and updated.    ROS   No chest pain, no shortness of breath, no abdominal pain and all other systems were reviewed and are negative.       Objective:     /71 (BP Location: Left arm, Patient Position: Sitting, BP Cuff Size: Adult)   Pulse 63   Temp 37.2 °C (98.9 °F) (Temporal)   Resp 14   Ht 1.651 m (5' 5\")   Wt 91.6 kg (202 lb)   SpO2 95%  Body mass index is 33.61 kg/m².   Physical Exam:  Constitutional: Alert, no distress.  Skin: Warm, dry, good turgor, no rashes in visible areas.  Eye: Equal, round and " reactive, conjunctiva clear, lids normal.  ENMT: Lips without lesions, good dentition, oropharynx clear.  Neck: Trachea midline, no masses.   Lymph: No cervical or supraclavicular lymphadenopathy  Respiratory: Unlabored respiratory effort, lungs appear clear, no wheezes.  Cardiovascular: Regular rate and rhythm.  Psych: Alert and oriented x3, normal affect and mood.        Assessment and Plan:   The following treatment plan was discussed    1. Essential hypertension  Chronic condition.  Stable.  I rechecked his blood pressure manually.  It was in normal range at 118/70.  I then checked it with our blood pressure machine and it read at 129/71.  Currently we will continue the current dosage of 10 mg amlodipine and 40 mg lisinopril.  Advised him to exercise and eat healthier to lose weight.  This will also further improve upon his blood pressure.  I will see him in 6 months.  - amLODIPine (NORVASC) 10 MG Tab; TAKE ONE TABLET BY MOUTH DAILY.  Dispense: 90 Tablet; Refill: 3         Followup: Return in about 6 months (around 7/19/2022), or if symptoms worsen or fail to improve.    Please note that this dictation was created using voice recognition software. I have made every reasonable attempt to correct obvious errors, but I expect that there are errors of grammar and possibly content that I did not discover before finalizing the note.

## 2022-01-28 DIAGNOSIS — I10 ESSENTIAL HYPERTENSION: ICD-10-CM

## 2022-01-28 RX ORDER — LISINOPRIL 40 MG/1
TABLET ORAL
Qty: 90 TABLET | Refills: 2 | Status: SHIPPED | OUTPATIENT
Start: 2022-01-28 | End: 2022-10-24

## 2022-02-07 ENCOUNTER — APPOINTMENT (OUTPATIENT)
Dept: MEDICAL GROUP | Facility: MEDICAL CENTER | Age: 57
End: 2022-02-07
Payer: COMMERCIAL

## 2022-11-04 ENCOUNTER — OFFICE VISIT (OUTPATIENT)
Dept: MEDICAL GROUP | Facility: MEDICAL CENTER | Age: 57
End: 2022-11-04
Payer: COMMERCIAL

## 2022-11-04 VITALS
SYSTOLIC BLOOD PRESSURE: 120 MMHG | OXYGEN SATURATION: 96 % | BODY MASS INDEX: 34.53 KG/M2 | DIASTOLIC BLOOD PRESSURE: 72 MMHG | WEIGHT: 207.23 LBS | TEMPERATURE: 97.3 F | HEART RATE: 75 BPM | HEIGHT: 65 IN

## 2022-11-04 DIAGNOSIS — Z23 NEED FOR IMMUNIZATION AGAINST INFLUENZA: ICD-10-CM

## 2022-11-04 DIAGNOSIS — E66.9 OBESITY (BMI 30-39.9): ICD-10-CM

## 2022-11-04 DIAGNOSIS — E78.00 PURE HYPERCHOLESTEROLEMIA: ICD-10-CM

## 2022-11-04 DIAGNOSIS — Z12.5 SCREENING PSA (PROSTATE SPECIFIC ANTIGEN): ICD-10-CM

## 2022-11-04 DIAGNOSIS — Z00.00 PREVENTATIVE HEALTH CARE: ICD-10-CM

## 2022-11-04 DIAGNOSIS — I10 ESSENTIAL HYPERTENSION: ICD-10-CM

## 2022-11-04 PROBLEM — M54.9 UPPER BACK PAIN ON LEFT SIDE: Status: RESOLVED | Noted: 2019-02-12 | Resolved: 2022-11-04

## 2022-11-04 PROBLEM — R06.83 SNORING: Status: RESOLVED | Noted: 2020-01-24 | Resolved: 2022-11-04

## 2022-11-04 PROBLEM — R53.83 FATIGUE: Status: RESOLVED | Noted: 2021-08-09 | Resolved: 2022-11-04

## 2022-11-04 PROCEDURE — 99214 OFFICE O/P EST MOD 30 MIN: CPT | Mod: 25 | Performed by: PHYSICIAN ASSISTANT

## 2022-11-04 PROCEDURE — 90686 IIV4 VACC NO PRSV 0.5 ML IM: CPT | Performed by: PHYSICIAN ASSISTANT

## 2022-11-04 PROCEDURE — 90471 IMMUNIZATION ADMIN: CPT | Performed by: PHYSICIAN ASSISTANT

## 2022-11-04 RX ORDER — LISINOPRIL 40 MG/1
40 TABLET ORAL DAILY
Qty: 90 TABLET | Refills: 2 | Status: SHIPPED | OUTPATIENT
Start: 2022-11-04

## 2022-11-04 RX ORDER — SIMVASTATIN 10 MG
10 TABLET ORAL EVERY EVENING
Qty: 90 TABLET | Refills: 2 | Status: SHIPPED | OUTPATIENT
Start: 2022-11-04

## 2022-11-04 RX ORDER — AMLODIPINE BESYLATE 10 MG/1
TABLET ORAL
Qty: 90 TABLET | Refills: 2 | Status: SHIPPED | OUTPATIENT
Start: 2022-11-04

## 2022-11-04 NOTE — ASSESSMENT & PLAN NOTE
This is a pleasant 57-year-old male accompanied by his wife, Gaby.  Here to discuss his health.  Needs renewals of his blood pressure medications.  Blood pressure is well controlled today.  Currently on amlodipine and lisinopril.

## 2022-11-04 NOTE — PROGRESS NOTES
"Subjective:   Olayinka Bartlett is a 57 y.o. male here today for hypertension, hypercholesterolemia and preventative health care.    Essential hypertension  This is a pleasant 57-year-old male accompanied by his wife, Gaby.  Here to discuss his health.  Needs renewals of his blood pressure medications.  Blood pressure is well controlled today.  Currently on amlodipine and lisinopril.    Pure hypercholesterolemia  Chronic condition.  Takes simvastatin 10 mg daily.  Needs a renewal and needs labs check overall preventative measures.       Current medicines (including changes today)  Current Outpatient Medications   Medication Sig Dispense Refill    amLODIPine (NORVASC) 10 MG Tab TAKE ONE TABLET BY MOUTH DAILY. 90 Tablet 2    lisinopril (PRINIVIL) 40 MG tablet Take 1 Tablet by mouth every day. 90 Tablet 2    simvastatin (ZOCOR) 10 MG Tab Take 1 Tablet by mouth every evening. 90 Tablet 2    Cholecalciferol (VITAMIN D) 125 MCG (5000 UT) Cap Take  by mouth.      aspirin (ASA) 81 MG CHEW Take 81 mg by mouth every day.       No current facility-administered medications for this visit.     He  has a past medical history of CAD (coronary artery disease) and Hypertension.    Social History and Family History were reviewed and updated.    ROS   No chest pain, no shortness of breath, no abdominal pain and all other systems were reviewed and are negative.       Objective:     /72 (BP Location: Left arm, Patient Position: Sitting, BP Cuff Size: Adult)   Pulse 75   Temp 36.3 °C (97.3 °F) (Temporal)   Ht 1.651 m (5' 5\")   Wt 94 kg (207 lb 3.7 oz)   SpO2 96%  Body mass index is 34.49 kg/m².   Physical Exam:  Constitutional: Alert, no distress.  Skin: Warm, dry, good turgor, no rashes in visible areas.  Eye: Equal, round and reactive, conjunctiva clear, lids normal.  ENMT: Lips without lesions, good dentition, oropharynx clear.  Neck: Trachea midline, no masses.   Lymph: No cervical or supraclavicular " lymphadenopathy  Respiratory: Unlabored respiratory effort, lungs appear clear.  Psych: Alert and oriented x3, normal affect and mood.        Assessment and Plan:   The following treatment plan was discussed    1. Essential hypertension  Chronic condition.  Stable.  Renewed medications as directed.  - amLODIPine (NORVASC) 10 MG Tab; TAKE ONE TABLET BY MOUTH DAILY.  Dispense: 90 Tablet; Refill: 2  - lisinopril (PRINIVIL) 40 MG tablet; Take 1 Tablet by mouth every day.  Dispense: 90 Tablet; Refill: 2    2. Pure hypercholesterolemia  Chronic condition.  Status unknown.  Ordered renewal of simvastatin.  Will check cholesterol profile.  - simvastatin (ZOCOR) 10 MG Tab; Take 1 Tablet by mouth every evening.  Dispense: 90 Tablet; Refill: 2    3. Obesity (BMI 30-39.9)  Chronic condition.  We will continue to monitor.  - Patient identified as having weight management issue.  Appropriate orders and counseling given.    4. Preventative health care  Ordered labs.  Fast 8 hours.  He will be contacted with the results.  - HEMOGLOBIN A1C; Future  - Lipid Profile; Future  - Comp Metabolic Panel; Future  - TSH WITH REFLEX TO FT4; Future    5. Screening PSA (prostate specific antigen)  PSA ordered.  Screening.  - PROSTATE SPECIFIC AG SCREENING; Future    6. Need for immunization against influenza  Administer without complaints.  - INFLUENZA VACCINE QUAD INJ (PF)         Followup: Return in about 6 months (around 5/4/2023), or if symptoms worsen or fail to improve.    Please note that this dictation was created using voice recognition software. I have made every reasonable attempt to correct obvious errors, but I expect that there are errors of grammar and possibly content that I did not discover before finalizing the note.

## 2022-11-04 NOTE — ASSESSMENT & PLAN NOTE
Chronic condition.  Takes simvastatin 10 mg daily.  Needs a renewal and needs labs check overall preventative measures.

## 2022-12-28 ENCOUNTER — HOSPITAL ENCOUNTER (OUTPATIENT)
Dept: LAB | Facility: MEDICAL CENTER | Age: 57
End: 2022-12-28
Attending: PHYSICIAN ASSISTANT
Payer: COMMERCIAL

## 2022-12-28 DIAGNOSIS — Z12.5 SCREENING PSA (PROSTATE SPECIFIC ANTIGEN): ICD-10-CM

## 2022-12-28 DIAGNOSIS — Z00.00 PREVENTATIVE HEALTH CARE: ICD-10-CM

## 2022-12-28 LAB
ALBUMIN SERPL BCP-MCNC: 4.6 G/DL (ref 3.2–4.9)
ALBUMIN/GLOB SERPL: 1.7 G/DL
ALP SERPL-CCNC: 48 U/L (ref 30–99)
ALT SERPL-CCNC: 63 U/L (ref 2–50)
ANION GAP SERPL CALC-SCNC: 10 MMOL/L (ref 7–16)
AST SERPL-CCNC: 25 U/L (ref 12–45)
BILIRUB SERPL-MCNC: 0.5 MG/DL (ref 0.1–1.5)
BUN SERPL-MCNC: 13 MG/DL (ref 8–22)
CALCIUM ALBUM COR SERPL-MCNC: 8.8 MG/DL (ref 8.5–10.5)
CALCIUM SERPL-MCNC: 9.3 MG/DL (ref 8.5–10.5)
CHLORIDE SERPL-SCNC: 104 MMOL/L (ref 96–112)
CHOLEST SERPL-MCNC: 170 MG/DL (ref 100–199)
CO2 SERPL-SCNC: 25 MMOL/L (ref 20–33)
CREAT SERPL-MCNC: 0.8 MG/DL (ref 0.5–1.4)
EST. AVERAGE GLUCOSE BLD GHB EST-MCNC: 134 MG/DL
GFR SERPLBLD CREATININE-BSD FMLA CKD-EPI: 104 ML/MIN/1.73 M 2
GLOBULIN SER CALC-MCNC: 2.7 G/DL (ref 1.9–3.5)
GLUCOSE SERPL-MCNC: 100 MG/DL (ref 65–99)
HBA1C MFR BLD: 6.3 % (ref 4–5.6)
HDLC SERPL-MCNC: 59 MG/DL
LDLC SERPL CALC-MCNC: 94 MG/DL
POTASSIUM SERPL-SCNC: 4.1 MMOL/L (ref 3.6–5.5)
PROT SERPL-MCNC: 7.3 G/DL (ref 6–8.2)
PSA SERPL-MCNC: 0.84 NG/ML (ref 0–4)
SODIUM SERPL-SCNC: 139 MMOL/L (ref 135–145)
TRIGL SERPL-MCNC: 87 MG/DL (ref 0–149)
TSH SERPL DL<=0.005 MIU/L-ACNC: 1.91 UIU/ML (ref 0.38–5.33)

## 2022-12-28 PROCEDURE — 84153 ASSAY OF PSA TOTAL: CPT

## 2022-12-28 PROCEDURE — 36415 COLL VENOUS BLD VENIPUNCTURE: CPT

## 2022-12-28 PROCEDURE — 80061 LIPID PANEL: CPT

## 2022-12-28 PROCEDURE — 83036 HEMOGLOBIN GLYCOSYLATED A1C: CPT

## 2022-12-28 PROCEDURE — 84443 ASSAY THYROID STIM HORMONE: CPT

## 2022-12-28 PROCEDURE — 80053 COMPREHEN METABOLIC PANEL: CPT

## 2023-05-05 ENCOUNTER — APPOINTMENT (OUTPATIENT)
Dept: MEDICAL GROUP | Facility: MEDICAL CENTER | Age: 58
End: 2023-05-05

## 2023-07-18 ENCOUNTER — HOSPITAL ENCOUNTER (OUTPATIENT)
Dept: RADIOLOGY | Facility: MEDICAL CENTER | Age: 58
End: 2023-07-18
Payer: COMMERCIAL

## 2023-07-18 DIAGNOSIS — M25.562 LEFT MEDIAL KNEE PAIN: ICD-10-CM

## 2023-07-18 PROCEDURE — 73564 X-RAY EXAM KNEE 4 OR MORE: CPT | Mod: LT
